# Patient Record
Sex: FEMALE | URBAN - NONMETROPOLITAN AREA
[De-identification: names, ages, dates, MRNs, and addresses within clinical notes are randomized per-mention and may not be internally consistent; named-entity substitution may affect disease eponyms.]

---

## 2022-09-20 ENCOUNTER — NURSE ONLY (OUTPATIENT)
Dept: LAB | Age: 47
End: 2022-09-20

## 2022-10-03 LAB — CYTOLOGY THIN PREP PAP: NORMAL

## 2023-09-25 ENCOUNTER — NURSE ONLY (OUTPATIENT)
Dept: LAB | Age: 48
End: 2023-09-25

## 2023-10-02 LAB — CYTOLOGY THIN PREP PAP: NORMAL

## 2023-11-06 ENCOUNTER — NURSE ONLY (OUTPATIENT)
Dept: LAB | Age: 48
End: 2023-11-06

## 2023-12-06 ENCOUNTER — NURSE ONLY (OUTPATIENT)
Dept: LAB | Age: 48
End: 2023-12-06

## 2024-01-26 ENCOUNTER — HOSPITAL ENCOUNTER (OUTPATIENT)
Age: 49
Discharge: HOME | End: 2024-01-26
Payer: COMMERCIAL

## 2024-01-26 LAB
ANION GAP SERPL CALC-SCNC: 12 MEQ/L (ref 8–16)
BASOPHILS ABSOLUTE: 0 THOU/MM3 (ref 0–0.1)
BASOPHILS NFR BLD AUTO: 0.4 %
BUN SERPL-MCNC: 12 MG/DL (ref 7–22)
CALCIUM SERPL-MCNC: 9.4 MG/DL (ref 8.5–10.5)
CHLORIDE SERPL-SCNC: 103 MEQ/L (ref 98–111)
CO2 SERPL-SCNC: 27 MEQ/L (ref 23–33)
CREAT SERPL-MCNC: 0.8 MG/DL (ref 0.4–1.2)
DEPRECATED RDW RBC AUTO: 41.3 FL (ref 35–45)
EKG ATRIAL RATE: 66 BPM
EKG P AXIS: 43 DEGREES
EKG P-R INTERVAL: 166 MS
EKG Q-T INTERVAL: 416 MS
EKG QRS DURATION: 76 MS
EKG QTC CALCULATION (BAZETT): 436 MS
EKG R AXIS: 49 DEGREES
EKG T AXIS: 52 DEGREES
EKG VENTRICULAR RATE: 66 BPM
EOSINOPHIL NFR BLD AUTO: 2.4 %
EOSINOPHILS ABSOLUTE: 0.1 THOU/MM3 (ref 0–0.4)
ERYTHROCYTE [DISTWIDTH] IN BLOOD BY AUTOMATED COUNT: 12.2 % (ref 11.5–14.5)
GFR SERPL CREATININE-BSD FRML MDRD: > 60 ML/MIN/1.73M2
GLUCOSE SERPL-MCNC: 87 MG/DL (ref 70–108)
HCT VFR BLD AUTO: 44.9 % (ref 37–47)
HGB BLD-MCNC: 15.2 GM/DL (ref 12–16)
IMM GRANULOCYTES # BLD AUTO: 0.01 THOU/MM3 (ref 0–0.07)
IMM GRANULOCYTES NFR BLD AUTO: 0.2 %
LYMPHOCYTES ABSOLUTE: 1.6 THOU/MM3 (ref 1–4.8)
LYMPHOCYTES NFR BLD AUTO: 35.1 %
MCH RBC QN AUTO: 31.3 PG (ref 26–33)
MCHC RBC AUTO-ENTMCNC: 33.9 GM/DL (ref 32.2–35.5)
MCV RBC AUTO: 92.4 FL (ref 81–99)
MONOCYTES ABSOLUTE: 0.3 THOU/MM3 (ref 0.4–1.3)
MONOCYTES NFR BLD AUTO: 7.5 %
NEUTROPHILS NFR BLD AUTO: 54.4 %
NRBC BLD AUTO-RTO: 0 /100 WBC
PLATELET # BLD AUTO: 302 THOU/MM3 (ref 130–400)
PMV BLD AUTO: 8.8 FL (ref 9.4–12.4)
POTASSIUM SERPL-SCNC: 4.4 MEQ/L (ref 3.5–5.2)
RBC # BLD AUTO: 4.86 MILL/MM3 (ref 4.2–5.4)
SEGMENTED NEUTROPHILS ABSOLUTE COUNT: 2.5 THOU/MM3 (ref 1.8–7.7)
SODIUM SERPL-SCNC: 142 MEQ/L (ref 135–145)
WBC # BLD AUTO: 4.6 THOU/MM3 (ref 4.8–10.8)

## 2024-01-26 PROCEDURE — 80048 BASIC METABOLIC PNL TOTAL CA: CPT

## 2024-01-26 PROCEDURE — 36415 COLL VENOUS BLD VENIPUNCTURE: CPT

## 2024-01-26 PROCEDURE — 85025 COMPLETE CBC W/AUTO DIFF WBC: CPT

## 2024-01-26 PROCEDURE — 93005 ELECTROCARDIOGRAM TRACING: CPT

## 2024-01-29 LAB
EKG ATRIAL RATE: 66 BPM
EKG P AXIS: 43 DEGREES
EKG P-R INTERVAL: 166 MS
EKG Q-T INTERVAL: 416 MS
EKG QRS DURATION: 76 MS
EKG QTC CALCULATION (BAZETT): 436 MS
EKG R AXIS: 49 DEGREES
EKG T AXIS: 52 DEGREES
EKG VENTRICULAR RATE: 66 BPM

## 2024-02-07 RX ORDER — ACETAMINOPHEN 325 MG/1
650 TABLET ORAL EVERY 6 HOURS PRN
COMMUNITY

## 2024-02-07 RX ORDER — M-VIT,TX,IRON,MINS/CALC/FOLIC 27MG-0.4MG
1 TABLET ORAL DAILY
COMMUNITY

## 2024-02-07 RX ORDER — LISINOPRIL 10 MG/1
10 TABLET ORAL DAILY
COMMUNITY

## 2024-02-07 RX ORDER — CALCIUM POLYCARBOPHIL 625 MG 625 MG/1
625 TABLET ORAL DAILY
COMMUNITY

## 2024-02-07 RX ORDER — CALCIUM CARBONATE 500(1250)
1000 TABLET ORAL DAILY
COMMUNITY

## 2024-02-07 RX ORDER — VALACYCLOVIR HYDROCHLORIDE 1 G/1
1000 TABLET, FILM COATED ORAL DAILY
COMMUNITY

## 2024-02-07 RX ORDER — CETIRIZINE HYDROCHLORIDE 10 MG/1
10 TABLET ORAL DAILY
COMMUNITY

## 2024-02-07 NOTE — PROGRESS NOTES
Follow all instructions given by your physician  Do not eat or drink anything after midnight prior to surgery(includes water, chewing gum, mints and ice chips)  Sips of water am of surgery with allowed medications  Do not use chewing tobacco after midnight prior to surgery  May brush teeth do not swallow water  Do not smoke, drink alcoholic beverages or use any illicit drugs for 24 hours prior to surgery  Bring insurance info and photo ID  Bring pertinent paperwork with you from Doctor or surgeons's office  Wear clean comfortable, loose-fitting clothing  No make-up, nail polish, jewelry, piercings, or contact lenses to be worn day of surgery  No glue on dentures morning of surgery; you will be asked to remove them for surgery. Case for glasses.  Shower the night before and the morning of surgery with cleansing soap provided or a liquid antibacterial soap, dry with new fresh clean towel after each shower, no lotions, creams or powder.  Clean sheets and pillowcase on bed night before surgery  Bring medications in original bottles, Bring rescue inhalers with you  Bring CPAP/BIPAP machine if you have one ( you may be charged if one is needed in recovery room )    Our pharmacy has a Meds to Beds program where they will deliver any new prescriptions you may have to your room before you leave. Our Pharmacy will clear it through your insurance; for example (same co pay). This enables you to take your new RX as soon as you need when you get home and avoids stop/wait delays on the way home.  Please have a form of payment with you and have someone designated as your Pharmacy contact with their phone # as you may not feel well or still be under the influence of anesthesia.    Please refer to the SSI-Surgical Site Infection Flyer you hopefully received in the mail-together we can prevent infections; signs and symptoms reviewed.  When discharged be sure you understand how to care for your wound and that you have the Dr./office  phone # to call if you have any concerns or questions about your wound.     needed at discharge and someone over 18 to stay with you for 24 hours overnight (surgery may be cancelled if you don't have this)  Report to [facility] on 2nd floor  If you would become ill prior to surgery, please call the surgeon  May have a visitor with you, we request that you limit to 2 visitors in pre-op area  Masks are recommended but not required, new masks at entrance desk  Call [phone] for any questions

## 2024-02-15 ENCOUNTER — ANESTHESIA EVENT (OUTPATIENT)
Dept: OPERATING ROOM | Age: 49
End: 2024-02-15
Payer: COMMERCIAL

## 2024-02-15 ENCOUNTER — ANESTHESIA (OUTPATIENT)
Dept: OPERATING ROOM | Age: 49
End: 2024-02-15
Payer: COMMERCIAL

## 2024-02-15 ENCOUNTER — HOSPITAL ENCOUNTER (OUTPATIENT)
Age: 49
Setting detail: OUTPATIENT SURGERY
Discharge: HOME | End: 2024-02-15
Attending: OBSTETRICS & GYNECOLOGY | Admitting: OBSTETRICS & GYNECOLOGY
Payer: COMMERCIAL

## 2024-02-15 VITALS
WEIGHT: 154.6 LBS | DIASTOLIC BLOOD PRESSURE: 95 MMHG | TEMPERATURE: 97 F | OXYGEN SATURATION: 98 % | SYSTOLIC BLOOD PRESSURE: 181 MMHG | HEART RATE: 65 BPM | RESPIRATION RATE: 16 BRPM | HEIGHT: 62 IN | BODY MASS INDEX: 28.45 KG/M2

## 2024-02-15 DIAGNOSIS — Z90.710 S/P HYSTERECTOMY: Primary | ICD-10-CM

## 2024-02-15 DIAGNOSIS — D06.9 CIN III (CERVICAL INTRAEPITHELIAL NEOPLASIA III): ICD-10-CM

## 2024-02-15 LAB
ABO: NORMAL
ANTIBODY SCREEN: NORMAL
PREGNANCY, URINE: NEGATIVE
RH FACTOR: NORMAL

## 2024-02-15 PROCEDURE — S2900 ROBOTIC SURGICAL SYSTEM: HCPCS | Performed by: OBSTETRICS & GYNECOLOGY

## 2024-02-15 PROCEDURE — 36415 COLL VENOUS BLD VENIPUNCTURE: CPT

## 2024-02-15 PROCEDURE — 3600000019 HC SURGERY ROBOT ADDTL 15MIN: Performed by: OBSTETRICS & GYNECOLOGY

## 2024-02-15 PROCEDURE — 7100000011 HC PHASE II RECOVERY - ADDTL 15 MIN: Performed by: OBSTETRICS & GYNECOLOGY

## 2024-02-15 PROCEDURE — 86850 RBC ANTIBODY SCREEN: CPT

## 2024-02-15 PROCEDURE — 6370000000 HC RX 637 (ALT 250 FOR IP): Performed by: OBSTETRICS & GYNECOLOGY

## 2024-02-15 PROCEDURE — 86900 BLOOD TYPING SEROLOGIC ABO: CPT

## 2024-02-15 PROCEDURE — 6360000002 HC RX W HCPCS: Performed by: OBSTETRICS & GYNECOLOGY

## 2024-02-15 PROCEDURE — 2580000003 HC RX 258: Performed by: OBSTETRICS & GYNECOLOGY

## 2024-02-15 PROCEDURE — 3700000000 HC ANESTHESIA ATTENDED CARE: Performed by: OBSTETRICS & GYNECOLOGY

## 2024-02-15 PROCEDURE — 2720000010 HC SURG SUPPLY STERILE: Performed by: OBSTETRICS & GYNECOLOGY

## 2024-02-15 PROCEDURE — 2500000003 HC RX 250 WO HCPCS: Performed by: NURSE ANESTHETIST, CERTIFIED REGISTERED

## 2024-02-15 PROCEDURE — 6360000002 HC RX W HCPCS: Performed by: NURSE ANESTHETIST, CERTIFIED REGISTERED

## 2024-02-15 PROCEDURE — 3700000001 HC ADD 15 MINUTES (ANESTHESIA): Performed by: OBSTETRICS & GYNECOLOGY

## 2024-02-15 PROCEDURE — 86901 BLOOD TYPING SEROLOGIC RH(D): CPT

## 2024-02-15 PROCEDURE — 2709999900 HC NON-CHARGEABLE SUPPLY: Performed by: OBSTETRICS & GYNECOLOGY

## 2024-02-15 PROCEDURE — 88307 TISSUE EXAM BY PATHOLOGIST: CPT

## 2024-02-15 PROCEDURE — 3600000009 HC SURGERY ROBOT BASE: Performed by: OBSTETRICS & GYNECOLOGY

## 2024-02-15 PROCEDURE — 88342 IMHCHEM/IMCYTCHM 1ST ANTB: CPT

## 2024-02-15 PROCEDURE — 7100000000 HC PACU RECOVERY - FIRST 15 MIN: Performed by: OBSTETRICS & GYNECOLOGY

## 2024-02-15 PROCEDURE — 7100000010 HC PHASE II RECOVERY - FIRST 15 MIN: Performed by: OBSTETRICS & GYNECOLOGY

## 2024-02-15 PROCEDURE — 7100000001 HC PACU RECOVERY - ADDTL 15 MIN: Performed by: OBSTETRICS & GYNECOLOGY

## 2024-02-15 PROCEDURE — 81025 URINE PREGNANCY TEST: CPT

## 2024-02-15 RX ORDER — DEXAMETHASONE SODIUM PHOSPHATE 4 MG/ML
INJECTION, SOLUTION INTRA-ARTICULAR; INTRALESIONAL; INTRAMUSCULAR; INTRAVENOUS; SOFT TISSUE PRN
Status: DISCONTINUED | OUTPATIENT
Start: 2024-02-15 | End: 2024-02-15 | Stop reason: SDUPTHER

## 2024-02-15 RX ORDER — ACETAMINOPHEN 500 MG
1000 TABLET ORAL EVERY 8 HOURS SCHEDULED
Status: CANCELLED | OUTPATIENT
Start: 2024-02-15

## 2024-02-15 RX ORDER — SODIUM CHLORIDE 9 MG/ML
INJECTION, SOLUTION INTRAVENOUS CONTINUOUS
Status: CANCELLED | OUTPATIENT
Start: 2024-02-15

## 2024-02-15 RX ORDER — PROPOFOL 10 MG/ML
INJECTION, EMULSION INTRAVENOUS PRN
Status: DISCONTINUED | OUTPATIENT
Start: 2024-02-15 | End: 2024-02-15 | Stop reason: SDUPTHER

## 2024-02-15 RX ORDER — OXYCODONE HYDROCHLORIDE 5 MG/1
10 TABLET ORAL EVERY 4 HOURS PRN
Status: DISCONTINUED | OUTPATIENT
Start: 2024-02-15 | End: 2024-02-15 | Stop reason: HOSPADM

## 2024-02-15 RX ORDER — ONDANSETRON 4 MG/1
4 TABLET, ORALLY DISINTEGRATING ORAL EVERY 8 HOURS PRN
Status: CANCELLED | OUTPATIENT
Start: 2024-02-15

## 2024-02-15 RX ORDER — LIDOCAINE HYDROCHLORIDE 20 MG/ML
INJECTION, SOLUTION INFILTRATION; PERINEURAL PRN
Status: DISCONTINUED | OUTPATIENT
Start: 2024-02-15 | End: 2024-02-15 | Stop reason: SDUPTHER

## 2024-02-15 RX ORDER — ROCURONIUM BROMIDE 10 MG/ML
INJECTION, SOLUTION INTRAVENOUS PRN
Status: DISCONTINUED | OUTPATIENT
Start: 2024-02-15 | End: 2024-02-15 | Stop reason: SDUPTHER

## 2024-02-15 RX ORDER — FENTANYL CITRATE 50 UG/ML
INJECTION, SOLUTION INTRAMUSCULAR; INTRAVENOUS PRN
Status: DISCONTINUED | OUTPATIENT
Start: 2024-02-15 | End: 2024-02-15 | Stop reason: SDUPTHER

## 2024-02-15 RX ORDER — SODIUM CHLORIDE 0.9 % (FLUSH) 0.9 %
5-40 SYRINGE (ML) INJECTION PRN
Status: DISCONTINUED | OUTPATIENT
Start: 2024-02-15 | End: 2024-02-15 | Stop reason: HOSPADM

## 2024-02-15 RX ORDER — OXYCODONE HYDROCHLORIDE 5 MG/1
5 TABLET ORAL EVERY 4 HOURS PRN
Status: DISCONTINUED | OUTPATIENT
Start: 2024-02-15 | End: 2024-02-15 | Stop reason: HOSPADM

## 2024-02-15 RX ORDER — SODIUM CHLORIDE 9 MG/ML
INJECTION, SOLUTION INTRAVENOUS CONTINUOUS
Status: DISCONTINUED | OUTPATIENT
Start: 2024-02-15 | End: 2024-02-15 | Stop reason: HOSPADM

## 2024-02-15 RX ORDER — SODIUM CHLORIDE 9 MG/ML
INJECTION, SOLUTION INTRAVENOUS PRN
Status: DISCONTINUED | OUTPATIENT
Start: 2024-02-15 | End: 2024-02-15 | Stop reason: HOSPADM

## 2024-02-15 RX ORDER — KETOROLAC TROMETHAMINE 10 MG/1
10 TABLET, FILM COATED ORAL EVERY 6 HOURS PRN
Qty: 20 TABLET | Refills: 0 | Status: SHIPPED | OUTPATIENT
Start: 2024-02-15 | End: 2025-02-14

## 2024-02-15 RX ORDER — ONDANSETRON 2 MG/ML
4 INJECTION INTRAMUSCULAR; INTRAVENOUS EVERY 6 HOURS PRN
Status: CANCELLED | OUTPATIENT
Start: 2024-02-15

## 2024-02-15 RX ORDER — SODIUM CHLORIDE 9 MG/ML
INJECTION, SOLUTION INTRAVENOUS PRN
Status: CANCELLED | OUTPATIENT
Start: 2024-02-15

## 2024-02-15 RX ORDER — SODIUM CHLORIDE 0.9 % (FLUSH) 0.9 %
5-40 SYRINGE (ML) INJECTION EVERY 12 HOURS SCHEDULED
Status: CANCELLED | OUTPATIENT
Start: 2024-02-15

## 2024-02-15 RX ORDER — OXYCODONE HYDROCHLORIDE 5 MG/1
5 TABLET ORAL EVERY 6 HOURS PRN
Qty: 28 TABLET | Refills: 0 | Status: SHIPPED | OUTPATIENT
Start: 2024-02-15 | End: 2024-02-22

## 2024-02-15 RX ORDER — SODIUM CHLORIDE 0.9 % (FLUSH) 0.9 %
5-40 SYRINGE (ML) INJECTION EVERY 12 HOURS SCHEDULED
Status: DISCONTINUED | OUTPATIENT
Start: 2024-02-15 | End: 2024-02-15 | Stop reason: HOSPADM

## 2024-02-15 RX ORDER — DOCUSATE SODIUM 100 MG/1
100 CAPSULE, LIQUID FILLED ORAL 2 TIMES DAILY
Status: CANCELLED | OUTPATIENT
Start: 2024-02-15

## 2024-02-15 RX ORDER — ONDANSETRON 2 MG/ML
INJECTION INTRAMUSCULAR; INTRAVENOUS PRN
Status: DISCONTINUED | OUTPATIENT
Start: 2024-02-15 | End: 2024-02-15 | Stop reason: SDUPTHER

## 2024-02-15 RX ORDER — HYDROMORPHONE HYDROCHLORIDE 2 MG/ML
INJECTION, SOLUTION INTRAMUSCULAR; INTRAVENOUS; SUBCUTANEOUS PRN
Status: DISCONTINUED | OUTPATIENT
Start: 2024-02-15 | End: 2024-02-15 | Stop reason: SDUPTHER

## 2024-02-15 RX ORDER — SODIUM CHLORIDE 0.9 % (FLUSH) 0.9 %
5-40 SYRINGE (ML) INJECTION PRN
Status: CANCELLED | OUTPATIENT
Start: 2024-02-15

## 2024-02-15 RX ADMIN — ROCURONIUM BROMIDE 50 MG: 10 INJECTION INTRAVENOUS at 10:04

## 2024-02-15 RX ADMIN — SODIUM CHLORIDE: 9 INJECTION, SOLUTION INTRAVENOUS at 11:15

## 2024-02-15 RX ADMIN — ROCURONIUM BROMIDE 10 MG: 10 INJECTION INTRAVENOUS at 10:51

## 2024-02-15 RX ADMIN — LIDOCAINE HYDROCHLORIDE 100 MG: 20 INJECTION, SOLUTION INFILTRATION; PERINEURAL at 10:04

## 2024-02-15 RX ADMIN — HYDROMORPHONE HYDROCHLORIDE 0.5 MG: 2 INJECTION INTRAMUSCULAR; INTRAVENOUS; SUBCUTANEOUS at 11:12

## 2024-02-15 RX ADMIN — DEXAMETHASONE SODIUM PHOSPHATE 4 MG: 4 INJECTION, SOLUTION INTRAMUSCULAR; INTRAVENOUS at 10:07

## 2024-02-15 RX ADMIN — FENTANYL CITRATE 100 MCG: 50 INJECTION, SOLUTION INTRAMUSCULAR; INTRAVENOUS at 10:04

## 2024-02-15 RX ADMIN — OXYCODONE 10 MG: 5 TABLET ORAL at 12:36

## 2024-02-15 RX ADMIN — ONDANSETRON 4 MG: 2 INJECTION INTRAMUSCULAR; INTRAVENOUS at 11:11

## 2024-02-15 RX ADMIN — PROPOFOL 200 MG: 10 INJECTION, EMULSION INTRAVENOUS at 10:04

## 2024-02-15 RX ADMIN — HYDROMORPHONE HYDROCHLORIDE 0.5 MG: 2 INJECTION INTRAMUSCULAR; INTRAVENOUS; SUBCUTANEOUS at 11:18

## 2024-02-15 RX ADMIN — HYDROMORPHONE HYDROCHLORIDE 0.5 MG: 2 INJECTION INTRAMUSCULAR; INTRAVENOUS; SUBCUTANEOUS at 11:29

## 2024-02-15 RX ADMIN — SODIUM CHLORIDE: 9 INJECTION, SOLUTION INTRAVENOUS at 09:58

## 2024-02-15 RX ADMIN — HYDROMORPHONE HYDROCHLORIDE 0.5 MG: 2 INJECTION INTRAMUSCULAR; INTRAVENOUS; SUBCUTANEOUS at 10:25

## 2024-02-15 RX ADMIN — SUGAMMADEX 200 MG: 100 INJECTION, SOLUTION INTRAVENOUS at 11:20

## 2024-02-15 RX ADMIN — Medication 2000 MG: at 09:59

## 2024-02-15 RX ADMIN — PHENYLEPHRINE HYDROCHLORIDE 100 MCG: 10 INJECTION INTRAVENOUS at 10:55

## 2024-02-15 ASSESSMENT — PAIN DESCRIPTION - DESCRIPTORS
DESCRIPTORS: ACHING
DESCRIPTORS: SORE

## 2024-02-15 ASSESSMENT — PAIN SCALES - GENERAL
PAINLEVEL_OUTOF10: 4
PAINLEVEL_OUTOF10: 4

## 2024-02-15 ASSESSMENT — PAIN DESCRIPTION - LOCATION: LOCATION: ABDOMEN

## 2024-02-15 ASSESSMENT — PAIN - FUNCTIONAL ASSESSMENT
PAIN_FUNCTIONAL_ASSESSMENT: 0-10
PAIN_FUNCTIONAL_ASSESSMENT: 0-10

## 2024-02-15 ASSESSMENT — PAIN DESCRIPTION - PAIN TYPE: TYPE: SURGICAL PAIN

## 2024-02-15 NOTE — PROGRESS NOTES
1129 Awake and oriented on arrival to PACU , pt c/o # 3 ABD pain , medicated with Dilaudid per CRNA  1145 resting resp easy O2 off  1200 resting resp easy  , awakens easily to name , states pain tolerable just feels sleepy    Meets criteria for discharge , transported to [facility]

## 2024-02-15 NOTE — DISCHARGE INSTRUCTIONS
DISCHARGE INSTRUCTIONS FOR  PATIENTS AND FAMILY  [facility]  [phone]  [address] [place] [address] [address]  [place], [place] [address]      Discharge Instructions for GYN SURGERY      Home Care    You may shower 2 days after surgery.  You may bathe/soak in water/swim in 2 weeks.    Keep your incision sites clean.    Wash your hands before changing the dressing.    Do not douche or put anything in your vagina, such as a tampon, until your doctor tells you otherwise. NO INTERCOURSE UNTIL YOU ARE TOLD OTHERWISE.   Diet    While in the hospital, you will progress from intravenous fluid to a normal diet.   Eat a high-fiber diet to promote healing and prevent constipation .    Drink plenty of fluids.    Physical Activity    Ask your doctor when you will be able to return to work.  You may drive 2 weeks after surgery if you are off narcotics for pain.    Return to your normal activities gradually. Most normal activities, including sex, can be resumed in about six weeks.    Take daily walks as tolerated.    Avoid heavy lifting and strenuous exercise until your doctor gives you permission to do so, usually 4-6 weeks.    If you are discharged with a catheter, you will be instructed on home care and when/how to remove it.  BE SURE ALL OF YOUR QUESTIONS ARE ANSWERED BEFORE DISCHARGE.      Medications    If you had to stop medicines before the procedure, ask your doctor when you can start again. Medicines often stopped include:   Anti-inflammatory drugs (eg, aspirin )   Blood thinners, like clopidogrel (Plavix) or warfarin (Coumadin)   You will likely go home with a prescription for pain medicine. If you had your ovaries removed with your uterus, you may be given an estrogen supplement.   Also, to prevent constipation, your doctor may recommend a stool softener.   If you are taking medicines, follow these general guidelines:   Take your medicine as directed. Do not change the amount or the schedule.   Do not stop taking them

## 2024-02-15 NOTE — ANESTHESIA POSTPROCEDURE EVALUATION
Department of Anesthesiology  Postprocedure Note    Patient: Katerin Perry  MRN: [id number]  YOB: 1975  Date of evaluation: 2/15/2024    Procedure Summary       Date: 02/15/24 Room / Location: [facility] OR [address] [place] [address] OR    Anesthesia Start: 0959 Anesthesia Stop: 1133    Procedure: Robotic Hysterectomy Bilateral Salpingectomy Cystoscopy (Uterus) Diagnosis:       BARRY III (cervical intraepithelial neoplasia III)      (BARRY III (cervical intraepithelial neoplasia III) [D06.9])    Surgeons: Joy Rey MD Responsible Provider: Emil Parker DO    Anesthesia Type: general ASA Status: 2            Anesthesia Type: No value filed.    Ajckie Phase I: Jackie Score: 10    Jackie Phase II: Jackie Score: 10    Anesthesia Post Evaluation    Patient location during evaluation: PACU  Patient participation: complete - patient participated  Level of consciousness: awake and alert  Pain score: 4  Airway patency: patent  Nausea & Vomiting: no nausea and no vomiting  Cardiovascular status: hemodynamically stable and blood pressure returned to baseline  Respiratory status: spontaneous ventilation, room air and acceptable  Hydration status: stable  Pain management: adequate and satisfactory to patient    No notable events documented.

## 2024-02-15 NOTE — ANESTHESIA PRE PROCEDURE
GLUCOSE 88 04/29/2023 06:30 AM    PROT 7.2 04/29/2023 06:30 AM    CALCIUM 9.4 01/26/2024 10:25 AM    BILITOT 0.5 04/29/2023 06:30 AM    ALKPHOS 84 04/29/2023 06:30 AM    AST 17 04/29/2023 06:30 AM    ALT 16 04/29/2023 06:30 AM       POC Tests: No results for input(s): \"POCGLU\", \"POCNA\", \"POCK\", \"POCCL\", \"POCBUN\", \"POCHEMO\", \"POCHCT\" in the last 72 hours.    Coags: No results found for: \"PROTIME\", \"INR\", \"APTT\"    HCG (If Applicable):   Lab Results   Component Value Date    PREGTESTUR negative 02/15/2024        ABGs: No results found for: \"PHART\", \"PO2ART\", \"RHU7BRK\", \"PUP1LBW\", \"BEART\", \"N3DVTSJQ\"     Type & Screen (If Applicable):  No results found for: \"LABABO\", \"LABRH\"    Drug/Infectious Status (If Applicable):  No results found for: \"HIV\", \"HEPCAB\"    COVID-19 Screening (If Applicable): No results found for: \"COVID19\"        Anesthesia Evaluation  Patient summary reviewed   no history of anesthetic complications:   Airway: Mallampati: II  TM distance: >3 FB   Neck ROM: full  Mouth opening: > = 3 FB   Dental: normal exam         Pulmonary:normal exam  breath sounds clear to auscultation                             Cardiovascular:    (+) hypertension:                  Neuro/Psych:               GI/Hepatic/Renal:             Endo/Other:                     Abdominal:             Vascular:          Other Findings:             Anesthesia Plan      general     ASA 2       Induction: intravenous.    MIPS: Postoperative opioids intended and Prophylactic antiemetics administered.  Anesthetic plan and risks discussed with patient and spouse.      Plan discussed with CRNA and surgical team.                    NICK FLOYD MD   2/15/2024

## 2024-02-15 NOTE — PROGRESS NOTES
Pt has met discharge criteria and states she is ready for discharge to home. IV removed, gauze and tape applied. Dressed in own clothes and personal belongings gathered. Discharge instructions (with opioid medication education information) given to pt and ; pt and  verbalized understanding of discharge instructions, prescriptions and follow up appointments. Pt transported to discharge lobby by [facility] staff.

## 2024-02-15 NOTE — PROGRESS NOTES
Pt returned to [facility] room 16. Vitals and assessment as charted. 0.9 infusing,  to count from PACU. Pt has crackers and water.  at the bedside. Pt and  verbalized understanding of discharge criteria and call light use. Call light in reach.

## 2024-02-15 NOTE — BRIEF OP NOTE
Brief Postoperative Note      Patient: Katerin Perry  YOB: 1975  MRN: [id number]    Date of Procedure: 2/15/2024    Pre-Op Diagnosis Codes:     * BARRY III (cervical intraepithelial neoplasia III) [D06.9]    Post-Op Diagnosis: Same       Procedure(s):  Robotic Hysterectomy Bilateral Salpingectomy Cystoscopy    Surgeon(s):  Joy Rey MD    Assistant:  First Assistant: Linette Osorio RN    Anesthesia: General    Estimated Blood Loss (mL): 50cc    Complications: None    Specimens:   ID Type Source Tests Collected by Time Destination   A : uterus, cervix and bilateral fallopian tubes Tissue Uterus SURGICAL PATHOLOGY Joy Rey MD 2/15/2024 1032        Implants:  * No implants in log *      Drains: * No LDAs found *    Findings: Normal appearing uterus tubes and ovaries    Op note: [id number]      Electronically signed by Joy Rey MD on 2/15/2024 at 11:20 AM

## 2024-02-15 NOTE — PROGRESS NOTES
Patient oriented to Same Day department and admitted to Same Day Surgery room 16.   Patient verbalized approval for first name, last initial with physician name on unit whiteboard.     Plan of care reviewed with patient.   Patient room whiteboard filled out and discussed with patient and responsible adult.   Patient and responsible adult offered Same Day Welcome Packet to review.    Call light in reach.   Bed in lowest position, locked, with one bed rail up.   SCDs and warming blanket in place.  Appropriate arm bands on patient.   Bathroom offered.   All questions and concerns of patient addressed.        Meds to Beds:   Patient informed of [facility] Meds to Beds program during admission. Patient is agreeable to program.   Contact information for the pharmacy and the Meds to Beds program:   Name: sophie   Relationship to patient:spouse/significant other   Phone number: [phone]

## 2024-02-15 NOTE — DISCHARGE SUMMARY
GYN Surgery  Discharge Summary     Patient ID:  Katerin Perry  [id number]  48 y.o.  1975    Admit date: 2/15/2024    Admitting Physician: Joy Rey MD    Discharge Diagnoses: BARRY III (cervical intraepithelial neoplasia III) [D06.9]    Discharged Condition: good      Procedures Performed: Robotic hysterectomy BS cystoscopy    Hospital Course: Patient was admitted on the day of surgery, and underwent an uncomplicated procedure.  See dictated op note.     Disposition: home    Patient Instructions:   Activity: activity as tolerated, no sex for 6 weeks, no driving while on analgesics, and no heavy lifting for 6 weeks  Diet: regular  Wound Care: as directed    Discharge Medication:      Medication List        START taking these medications      ketorolac 10 MG tablet  Commonly known as: TORADOL  Take 1 tablet by mouth every 6 hours as needed for Pain     oxyCODONE 5 MG immediate release tablet  Commonly known as: Roxicodone  Take 1 tablet by mouth every 6 hours as needed for Pain for up to 7 days. Intended supply: 7 days. Take lowest dose possible to manage pain Max Daily Amount: 20 mg            CONTINUE taking these medications      acetaminophen 325 MG tablet  Commonly known as: TYLENOL     calcium carbonate 500 MG Tabs tablet  Commonly known as: OSCAL     cetirizine 10 MG tablet  Commonly known as: ZYRTEC     lisinopril 10 MG tablet  Commonly known as: PRINIVIL;ZESTRIL     polycarbophil 625 MG tablet  Commonly known as: FIBERCON     therapeutic multivitamin-minerals tablet     valACYclovir 1 g tablet  Commonly known as: VALTREX               Where to Get Your Medications        These medications were sent to [facility]/pharmacy #[id number] - [place], [place] - [address] [facility] - P [phone] - F [phone]  [address] [place] [id number]      Phone: [phone]   ketorolac 10 MG tablet  oxyCODONE 5 MG immediate release tablet          Discharge Date: 2/15/24    Condition: Good    Follow-up  with Joy Rey MD in 1 week    Signed:  Electronically signed by Joy Rey MD on 2/15/2024 at 11:19 AM

## 2024-02-15 NOTE — OP NOTE
[place] [id number]                                OPERATIVE REPORT    PATIENT NAME: KOUTRNEY ROBERTS                 :        1975  MED REC NO:   [id number]                           ROOM:  ACCOUNT NO:   [id number]                           ADMIT DATE: 02/15/2024  PROVIDER:     Joy Rey M.D.    DATE OF PROCEDURE:  02/15/2024    PREOPERATIVE DIAGNOSIS:  BARRY-3.    POSTOPERATIVE DIAGNOSIS:  BARRY-3.    PROCEDURES:  Robotic hysterectomy, bilateral salpingectomy, cystoscopy.    ANESTHESIA:  General.    SURGEON:  Joy Rey MD    EBL:  50 mL.    COMPLICATIONS:  None.    SPECIMENS:  Uterus, cervix, and bilateral fallopian tubes.    DESCRIPTION OF THE PROCEDURE:  The patient was taken back to the  operating room.  General anesthesia was obtained without difficulty.   She was then placed in the dorsal lithotomy position, prepared and  draped in normal sterile fashion.  A weighted speculum placed in the  vagina.  A [place] was used to retract the anterior vaginal wall.   Single-tooth tenaculum was used to grasp the anterior lip of the cervix.  Her cervix was then serially dilated.  0 Vicryl stay sutures were placed  in both the anterior and posterior lip of the cervix.  A large VCare  device was placed with the green cup tied to the cervix.  Blue cup was  moved adjacent to the green cup.  Adequate mobility of the uterus was  noted.  A [place] catheter was then placed as well.  Her legs were then  lowered.  A change of gloves occurred.  Attention was turned to the  abdominal portion of the procedure.  A supraumbilical incision was made.  Entry was gained into the abdominal cavity blindly.  Intra-abdominal  placement was confirmed visually.  Insufflation occurred under direct  visualization as well.  8-mm ports were then placed bilaterally about 10  cm, bilateral to the camera port, and then in the left upper quadrant,  an

## 2024-02-15 NOTE — PROGRESS NOTES
Notified  of pts increased blood pressure. Pt is automatic, okay to discharge pt an have her take her BP medication at home. Pt states she did not take it and verbalized taking her lisinopril when she gets home.

## 2024-02-15 NOTE — H&P
[facility]  History and Physicial      Patient:  Katerin Perry  YOB: 1975  MRN: [id number]     Acct: [id number]    HISTORY OF PRESENT ILLNESS:     Katerin Perry is a 48 y.o. female with endocervical CIN3.    Obstetric History: No obstetric history on file.    Past Medical History:        Diagnosis Date    Hypertension        Past Surgical History:        Procedure Laterality Date    ECTOPIC PREGNANCY SURGERY  2013    WISDOM TOOTH EXTRACTION         Medications: Scheduled Meds:   sodium chloride flush  5-40 mL IntraVENous 2 times per day    ceFAZolin (ANCEF) IVPB  2,000 mg IntraVENous On Call to OR     Continuous Infusions:   sodium chloride      sodium chloride       PRN Meds:.sodium chloride flush, sodium chloride    Allergies:  Neosporin [bacitracin-polymyxin b]    Social History:    reports that she has never smoked. She has never used smokeless tobacco. She reports that she does not use drugs.    Family History:       Problem Relation Age of Onset    High Blood Pressure Mother     Cancer Brother         melanoma    High Blood Pressure Brother     Breast Cancer Maternal Grandmother     Colon Cancer Maternal Grandfather     Diabetes Paternal Grandmother     Cancer Paternal Grandmother         lung cancer       Review of Systems:  General ROS: negative  Respiratory ROS: negative  Cardiovascular ROS: negative  Gastrointestinal ROS: negative  Musculoskeletal ROS: negative  Neurological ROS: negative    Physical Exam:    Vitals:Patient Vitals for the past 24 hrs:   BP Temp Temp src Pulse Resp SpO2 Height Weight   02/15/24 0842 (!) 164/96 97 °F (36.1 °C) Temporal 78 16 100 % 1.575 m (5' 2\") 70.1 kg (154 lb 9.6 oz)          Wt Readings from Last 1 Encounters:   02/15/24 70.1 kg (154 lb 9.6 oz)         General appearance - alert, well appearing, and in no distress  Abdomen - deferred  Pelvic - deferred  Neurological - alert, oriented, normal speech, no focal findings or movement

## 2024-02-29 ENCOUNTER — HOSPITAL ENCOUNTER (OUTPATIENT)
Dept: PHYSICAL THERAPY | Age: 49
Setting detail: THERAPIES SERIES
Discharge: HOME | End: 2024-02-29
Payer: COMMERCIAL

## 2024-02-29 PROCEDURE — 97530 THERAPEUTIC ACTIVITIES: CPT

## 2024-02-29 PROCEDURE — 97140 MANUAL THERAPY 1/> REGIONS: CPT

## 2024-02-29 PROCEDURE — 97110 THERAPEUTIC EXERCISES: CPT

## 2024-02-29 PROCEDURE — 97163 PT EVAL HIGH COMPLEX 45 MIN: CPT

## 2024-02-29 NOTE — PROGRESS NOTES
** PLEASE SIGN, DATE AND TIME CERTIFICATION BELOW AND RETURN TO [facility] OUTPATIENT REHABILITATION (FAX #: [phone]).  ATTEST/CO-SIGN IF ACCESSING VIA [employer].  THANK YOU.**    I certify that I have examined the patient below and determined that Physical Medicine and Rehabilitation service is necessary and that I approve the established plan of care for up to 90 days or as specifically noted.  Attestation, signature or co-signature of physician indicates approval of certification requirements.    ________________________ ____________ __________  Physician Signature   Date   Time  [facility]  PHYSICAL THERAPY  OUTPATIENT REHABILITATION - SPECIALIZED THERAPY SERVICES  [x] PELVIC HEALTH EVALUATION  [] DAILY NOTE  [] PROGRESS NOTE [] DISCHARGE NOTE    Date: 2024  Patient Name:  Katerin Perry  : 1975  MRN: [id number]  CSN: [id number]    Referring Practitioner Joy Rey MD   Diagnosis Low back pain, unspecified   Treatment Diagnosis M62.89 - Other Specified Disorders of Muscle  M62.81 - Muscle Weakness (Generalized)  M54.5 - Low Back Pain  M62.830 - Muscle Spasms of Back, K55.09 - Other Constipation, and R10.30 - Lower Abdominal Pain, Unspecified  L90.5 - Scar Conditions and Fibrosis of Skin  M79.10 - Myalgia, Unspecified Site   Date of Evaluation 24    Additional Pertinent History HTN, ectopic pregnancy , hysterectomy       Functional Outcome Measure Used Modified HUDSON   Functional Outcome Score 30/50 (24)       Insurance: Primary: Payor: [id number] /  /  / ,   Secondary: UMR   Authorization Information: No precert required   Visit # 1, 1/10 for progress note (Reporting Period: 24 to     )   Visits Allowed: Allowed 25 visits per calendar year.  0 visits have been used. FOR CPT CODE 07234 AQUATIC ONLY 10 VISITS ALLOWED. Soft Max..    Recertification Date: 24   Physician Follow-Up: Mid March   Physician Orders:    History of Present Illness:

## 2024-03-04 ENCOUNTER — HOSPITAL ENCOUNTER (OUTPATIENT)
Dept: PHYSICAL THERAPY | Age: 49
Setting detail: THERAPIES SERIES
Discharge: HOME | End: 2024-03-04
Payer: COMMERCIAL

## 2024-03-04 PROCEDURE — 97140 MANUAL THERAPY 1/> REGIONS: CPT

## 2024-03-04 PROCEDURE — 97530 THERAPEUTIC ACTIVITIES: CPT

## 2024-03-04 PROCEDURE — 97110 THERAPEUTIC EXERCISES: CPT

## 2024-03-04 NOTE — PROGRESS NOTES
[facility]  PHYSICAL THERAPY  OUTPATIENT REHABILITATION - SPECIALIZED THERAPY SERVICES  [] PELVIC HEALTH EVALUATION  [x] DAILY NOTE  [] PROGRESS NOTE [] DISCHARGE NOTE    Date: 3/4/2024  Patient Name:  Katerin Perry  : 1975  MRN: [id number]  CSN: [id number]    Referring Practitioner Joy Rey MD   Diagnosis Low back pain, unspecified   Treatment Diagnosis M62.89 - Other Specified Disorders of Muscle  M62.81 - Muscle Weakness (Generalized)  M54.5 - Low Back Pain  M62.830 - Muscle Spasms of Back, K55.09 - Other Constipation, and R10.30 - Lower Abdominal Pain, Unspecified  L90.5 - Scar Conditions and Fibrosis of Skin  M79.10 - Myalgia, Unspecified Site   Date of Evaluation 24    Additional Pertinent History HTN, ectopic pregnancy , hysterectomy       Functional Outcome Measure Used Modified HUDSNO   Functional Outcome Score 30/50 (24)       Insurance: Primary: Payor: MEDSurphaceN /  /  / ,   Secondary: UMR   Authorization Information: No precert required   Visit # 2, 2/10 for progress note (Reporting Period: 24 to     )   Visits Allowed: Allowed 25 visits per calendar year.  0 visits have been used. FOR CPT CODE 55042 AQUATIC ONLY 10 VISITS ALLOWED. Soft Max.   Recertification Date: 24   Physician Follow-Up: Mid March   Physician Orders:    History of Present Illness: Pt is a 49 year old female s/p hysterectomy on 2/15/24. Pt reports that her muscles in her lower back feel very tight. She states that she can not get them to relax and it is very uncomfortable. States that she is sitting and laying a lot and feels like she is very stiff. Bathing and getting dressed both are uncomfortable for her with bending forward. Hysterectomy was done due to abnormal cells in the cervix and it was decided to remove the uterus to avoid any complications. Ovaries remain intact at this time. She is on a 10# weight lifting restriction at this time. Had f/u on  and was

## 2024-03-06 ENCOUNTER — HOSPITAL ENCOUNTER (OUTPATIENT)
Dept: PHYSICAL THERAPY | Age: 49
Setting detail: THERAPIES SERIES
Discharge: HOME | End: 2024-03-06
Payer: COMMERCIAL

## 2024-03-06 PROCEDURE — 97140 MANUAL THERAPY 1/> REGIONS: CPT

## 2024-03-06 PROCEDURE — 97530 THERAPEUTIC ACTIVITIES: CPT

## 2024-03-06 PROCEDURE — 97110 THERAPEUTIC EXERCISES: CPT

## 2024-03-06 NOTE — PROGRESS NOTES
Palpation Tender to palp and tightness noted at B iliopsoas with L>R; tender throughout B alice from L1 through sacrum; Tender at B piri with L>R; tender at B glutes with L>R; Tender at B QL with L>R   Observation    Posture WNL   Range of Motion BLE WNL and lumbar flexion limited 25% ; tightness in B hamstrings with L>R   Strength BLE WNL and core 2/5   Gait WNL   Sensation WNL   Edema WNL   Balance/Fall History Denies balance or fall problems   Special Tests            TREATMENT   Precautions:    Pain:     \"X” in shaded column indicates activity completed today    “*\" next to exercise/intervention indicates progression   Modalities Parameters/  Location  Notes                     Manual Therapy Time/Technique  Notes   Lumbar STM 30 min x Prone, thoracic spine with icy-hot   Cupping lumbar alice   With lalo pose, lat lalo pose   Kinesiology tape            Exercise/Intervention   Notes   Nature of condition; PT POC 10 min  x    Dietary fiber, toilet positioning, hydration, stool softener, activity levels 15 min  x    Diaphragmatic breathing for pain control 5 min  x    Seated SKTC 3x30 sec  x    Prone lying 3 min  x To follow seated SKTC   Hamstring stretch 3x30 sec  x    Piri stretch push and pull 3x30 sec  x    Supine adductor stretch 3x30 sec  x    Swiss ball roll outs forward/lateral or table slides 3x30 sec  x                    Specific Interventions Next Treatment: lumbar STM, abdominal STM once bruising is improved, abdominal massage for constipation once incisions are healed; gross hip girdle stretching, core strengthening once pain is managed     Activity/Treatment Tolerance:  [x]  Patient tolerated treatment well  []  Patient limited by fatigue  []  Patient limited by pain   []  Patient limited by medical complications  []  Other:     Patient Education:   [x]  HEP/Education Completed: Patient to continue doing stretching HEP as noted above along with diaphragmatic breathing. Pt also encouraged to

## 2024-03-12 ENCOUNTER — HOSPITAL ENCOUNTER (OUTPATIENT)
Dept: PHYSICAL THERAPY | Age: 49
Setting detail: THERAPIES SERIES
Discharge: HOME | End: 2024-03-12
Payer: COMMERCIAL

## 2024-03-12 PROCEDURE — 97140 MANUAL THERAPY 1/> REGIONS: CPT

## 2024-03-12 PROCEDURE — 97110 THERAPEUTIC EXERCISES: CPT

## 2024-03-12 NOTE — PROGRESS NOTES
over the couch and bending the knees, leaning forward and putting arms up   Maximum Intensity 5/10   Best Intensity 1/10   Todays Rating 4/10   Other/Function      SEXUAL /MENSTRUAL HISTORY [] Deferred secondary to:    Age of First Menstrual Period 14   Are Cycles Regular yes    Pain During Menses no   Number of Pregnancies 1 - a few miscarries    Number of Vaginal Deliveries 1   Number of Caesarean Deliveries 0     BLADDER ASSESSMENT [] Deferred secondary to:   Daily Fluid Ingestion: 2 bottles water recently (used to be 8-10 before surgery), occasional tea, occasional tea, occasional sprite     Urination Frequency Times/Day: every few hours  Times/Night: 0   Volume Large and Medium   Urge Sensation Normal    SYMPTOM QUESTIONNAIRE   Loses Urine Upon: NA   Incontinence Volume: NA   Frequency of Leakage: NA   Wets the Bed: no   Burning/Pain with Urination: no   Difficulty Starting a Stream of Urine: no   Incomplete Emptying No   Strain to Empty Bladder: no   “Falling Out” Feeling: no   Urinate more than 7 times/day: no   Use a form of Leakage Protection: no   Restrict Fluid Intake: no   Stream Strength Average       BOWEL ASSESSMENT [] Deferred secondary to:   Frequency: Every few days; used to be daily   Most Common Stool Consistency: [place] 2   SYMPTOM QUESTIONNAIRE   Strain to have a BM: no   Include fiber in your diet: yes: fiber supplements    Take laxatives/enemas regularly: yes: Colace PRN; Doculax    Pain with BM: yes   Strong urge to have BM: no   Leak/Stain Feces: no   Diarrhea often: no         SEXUAL ASSESSMENT [] Deferred secondary to:   Sexually Active No - post op   Pain with [place] (Dyspareunia) No pain reported         VITALS [] Deferred secondary to:   Height 52\"   Weight 153#       GENERAL ASSESSMENT   [] Deferred secondary to:   Palpation Tender to palp and tightness noted at B iliopsoas with L>R; tender throughout B alice from L1 through sacrum; Tender at B piri with L>R; tender at B glutes

## 2024-03-14 ENCOUNTER — HOSPITAL ENCOUNTER (OUTPATIENT)
Dept: PHYSICAL THERAPY | Age: 49
Setting detail: THERAPIES SERIES
Discharge: HOME | End: 2024-03-14
Payer: COMMERCIAL

## 2024-03-14 PROCEDURE — 97110 THERAPEUTIC EXERCISES: CPT

## 2024-03-14 PROCEDURE — 97140 MANUAL THERAPY 1/> REGIONS: CPT

## 2024-03-14 NOTE — PROGRESS NOTES
[facility]  PHYSICAL THERAPY  OUTPATIENT REHABILITATION - SPECIALIZED THERAPY SERVICES  [] PELVIC HEALTH EVALUATION  [x] DAILY NOTE  [] PROGRESS NOTE [] DISCHARGE NOTE    Date: 3/14/2024  Patient Name:  Katerin Perry  : 1975  MRN: [id number]  CSN: [id number]    Referring Practitioner Joy Rey MD   Diagnosis Low back pain, unspecified   Treatment Diagnosis M62.89 - Other Specified Disorders of Muscle  M62.81 - Muscle Weakness (Generalized)  M54.5 - Low Back Pain  M62.830 - Muscle Spasms of Back, K55.09 - Other Constipation, and R10.30 - Lower Abdominal Pain, Unspecified  L90.5 - Scar Conditions and Fibrosis of Skin  M79.10 - Myalgia, Unspecified Site   Date of Evaluation 24    Additional Pertinent History HTN, ectopic pregnancy , hysterectomy       Functional Outcome Measure Used Modified HUDSON   Functional Outcome Score 30/50 (24)       Insurance: Primary: Payor: [email or web address] /  /  / ,   Secondary: UMR   Authorization Information: No precert required   Visit # 5, 510 for progress note (Reporting Period: 24 to     )   Visits Allowed: Allowed 25 visits per calendar year.  0 visits have been used. FOR CPT CODE 69161 AQUATIC ONLY 10 VISITS ALLOWED. Soft Max.   Recertification Date: 24   Physician Follow-Up: Mid March   Physician Orders:    History of Present Illness: Pt is a 49 year old female s/p hysterectomy on 2/15/24. Pt reports that her muscles in her lower back feel very tight. She states that she can not get them to relax and it is very uncomfortable. States that she is sitting and laying a lot and feels like she is very stiff. Bathing and getting dressed both are uncomfortable for her with bending forward. Hysterectomy was done due to abnormal cells in the cervix and it was decided to remove the uterus to avoid any complications. Ovaries remain intact at this time. She is on a 10# weight lifting restriction at this time. Had f/u on  and was

## 2024-03-18 ENCOUNTER — HOSPITAL ENCOUNTER (OUTPATIENT)
Dept: PHYSICAL THERAPY | Age: 49
Setting detail: THERAPIES SERIES
Discharge: HOME | End: 2024-03-18
Payer: COMMERCIAL

## 2024-03-18 PROCEDURE — 97110 THERAPEUTIC EXERCISES: CPT

## 2024-03-18 PROCEDURE — 97140 MANUAL THERAPY 1/> REGIONS: CPT

## 2024-03-18 NOTE — PROGRESS NOTES
told that everything is looking good. Katorolax and oxycodone for pain PRN. Colace PRN.      SUBJECTIVE: Pt reports continued relief of ant R hip/abdominal pain. States that she was able to walk around the grocery store yesterday; however, did have muscle spasms in the upper and lower back yesterday that did seem to be coming more at random instead of only with transitional movements which were occurring before. She does continue to have spotting but states that it has not worsened any.     Social/Functional History and Current Status:  Medications and Allergies have been reviewed and are listed on Medical History Questionnaire.    Katerin Perry lives with spouse in a single story home with stairs and no handrail to enter.    Task Previous Current   ADL’s  Independent Modified Ind, pts  is doing majority of the house    IADL's Independent Increased pain with getting dressed and showering; needs assistance with tying shoes    Ambulation Independent Independent   Transfers Independent Independent   Recreation Independent walking, yoga in the past Assistance Required   Community Integration Independent Independent   Driving Active  Active  but not at this time due to post-op restrictions   Work Full-Time.  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   Approved 6 weeks off, has not returned yet due to pain     PAIN    Location Across low back; up center of back to just below the shoulder blades; below sternum with sitting upright; gas pressure as bowels have not been moving regularly which gives pain down in the lower abdomen    Description Tightness; stiffness    Increased by Showering, getting dressed    Decreased by Leaning over the couch and bending the knees, leaning forward and putting arms up   Maximum Intensity 5/10   Best Intensity 1/10   Todays Rating 4/10   Other/Function      SEXUAL /MENSTRUAL HISTORY [] Deferred

## 2024-03-20 ENCOUNTER — HOSPITAL ENCOUNTER (OUTPATIENT)
Dept: PHYSICAL THERAPY | Age: 49
Setting detail: THERAPIES SERIES
Discharge: HOME | End: 2024-03-20
Payer: COMMERCIAL

## 2024-03-20 PROCEDURE — 97110 THERAPEUTIC EXERCISES: CPT

## 2024-03-20 PROCEDURE — 97140 MANUAL THERAPY 1/> REGIONS: CPT

## 2024-03-20 NOTE — PROGRESS NOTES
Showering, getting dressed    Decreased by Leaning over the couch and bending the knees, leaning forward and putting arms up   Maximum Intensity 5/10   Best Intensity 1/10   Todays Rating 4/10   Other/Function      SEXUAL /MENSTRUAL HISTORY [] Deferred secondary to:    Age of First Menstrual Period 14   Are Cycles Regular yes    Pain During Menses no   Number of Pregnancies 1 - a few miscarries    Number of Vaginal Deliveries 1   Number of Caesarean Deliveries 0     BLADDER ASSESSMENT [] Deferred secondary to:   Daily Fluid Ingestion: 2 bottles water recently (used to be 8-10 before surgery), occasional tea, occasional tea, occasional sprite     Urination Frequency Times/Day: every few hours  Times/Night: 0   Volume Large and Medium   Urge Sensation Normal    SYMPTOM QUESTIONNAIRE   Loses Urine Upon: NA   Incontinence Volume: NA   Frequency of Leakage: NA   Wets the Bed: no   Burning/Pain with Urination: no   Difficulty Starting a Stream of Urine: no   Incomplete Emptying No   Strain to Empty Bladder: no   “Falling Out” Feeling: no   Urinate more than 7 times/day: no   Use a form of Leakage Protection: no   Restrict Fluid Intake: no   Stream Strength Average       BOWEL ASSESSMENT [] Deferred secondary to:   Frequency: Every few days; used to be daily   Most Common Stool Consistency: [place] 2   SYMPTOM QUESTIONNAIRE   Strain to have a BM: no   Include fiber in your diet: yes: fiber supplements    Take laxatives/enemas regularly: yes: Colace PRN; Doculax    Pain with BM: yes   Strong urge to have BM: no   Leak/Stain Feces: no   Diarrhea often: no         SEXUAL ASSESSMENT [] Deferred secondary to:   Sexually Active No - post op   Pain with [place] (Dyspareunia) No pain reported         VITALS [] Deferred secondary to:   Height 52\"   Weight 153#       GENERAL ASSESSMENT   [] Deferred secondary to:   Palpation Tender to palp and tightness noted at B iliopsoas with L>R; tender throughout B alice from L1 through

## 2024-03-26 ENCOUNTER — HOSPITAL ENCOUNTER (OUTPATIENT)
Dept: PHYSICAL THERAPY | Age: 49
Setting detail: THERAPIES SERIES
Discharge: HOME | End: 2024-03-26
Payer: COMMERCIAL

## 2024-03-26 PROCEDURE — 97140 MANUAL THERAPY 1/> REGIONS: CPT

## 2024-03-26 PROCEDURE — 97110 THERAPEUTIC EXERCISES: CPT

## 2024-03-26 NOTE — PROGRESS NOTES
activity tolerance, Impaired ROM, Impaired strength, and Pain  Prognosis: Excellent    GOALS:  Patient Goal: to have less pain    Short Term Goals:  Time Frame: 4 weeks  Patient will report reduced back pain at rest from 1/10 to 0/10 in order to increase ability to sleep at night.  Patient will increase standing tolerance to 15 minutes in order to complete household activities and ADLs.  Patient will report ability to sit upright without upper abdominal pain in order to complete a 4 hour work shift.   Patient will be independent with basic HEP for lumbar and gross hip girdle stretching.  Patient to demonstrate normal stool consistency 90% of the time with 90% improvement of sensation of complete defecation due to improved toileting and dietary habits along with improving PRM control.  This pt will demo a good understanding of proper toilet posture to decrease PFM tone and to improve visceral alignment for increased ease of defecation and reduced pelvic organ strain.        Long Term Goals:  Time Frame: 12 weeks  Patient will report reduced pain levels at worst from 5/10 to 1/10 in order to return to work full time.   Patient will demonstrate reduced fear avoidance behaviors and be able to perform transfers and all functional mobility tasks at a normalized pace without slow cautious movements.   Patient will be independent with progressed HEP to include core strengthening.  Patient will Increase abdominal strength of 4/5 or greater with habitual inferior to superior approach to allow for increased pelvic organ support.  Patient's Modified HUDSON Score will improve to 10/50 or less to demonstrate functional improvement in condition.      PLAN:  Treatment Recommendations: Strengthening, Range of Motion, Functional Mobility Training, Transfer Training, Endurance Training, Neuromuscular Re-education, Manual Therapy - Soft Tissue Mobilization, Pain Management, Home Exercise Program, Patient Education, Safety Education and

## 2024-03-28 ENCOUNTER — HOSPITAL ENCOUNTER (OUTPATIENT)
Dept: PHYSICAL THERAPY | Age: 49
Setting detail: THERAPIES SERIES
Discharge: HOME | End: 2024-03-28
Payer: COMMERCIAL

## 2024-03-28 PROCEDURE — 97140 MANUAL THERAPY 1/> REGIONS: CPT

## 2024-03-28 PROCEDURE — 97110 THERAPEUTIC EXERCISES: CPT

## 2024-03-28 NOTE — PROGRESS NOTES
[facility]  PHYSICAL THERAPY  OUTPATIENT REHABILITATION - SPECIALIZED THERAPY SERVICES  [] PELVIC HEALTH EVALUATION  [x] DAILY NOTE  [] PROGRESS NOTE [] DISCHARGE NOTE    Date: 3/28/2024  Patient Name:  Katerin Perry  : 1975  MRN: [id number]  CSN: [id number]    Referring Practitioner Joy Rey MD   Diagnosis Low back pain, unspecified   Treatment Diagnosis M62.89 - Other Specified Disorders of Muscle  M62.81 - Muscle Weakness (Generalized)  M54.5 - Low Back Pain  M62.830 - Muscle Spasms of Back, K55.09 - Other Constipation, and R10.30 - Lower Abdominal Pain, Unspecified  L90.5 - Scar Conditions and Fibrosis of Skin  M79.10 - Myalgia, Unspecified Site   Date of Evaluation 24    Additional Pertinent History HTN, ectopic pregnancy , hysterectomy       Functional Outcome Measure Used Modified HUDSON   Functional Outcome Score 30/50 (24)       Insurance: Primary: Payor: [employer] /  /  / ,   Secondary: UMR   Authorization Information: No precert required   Visit # 8, 8/10 for progress note (Reporting Period: 24 to     )   Visits Allowed: Allowed 25 visits per calendar year.  0 visits have been used. FOR CPT CODE 45760 AQUATIC ONLY 10 VISITS ALLOWED. Soft Max.   Recertification Date: 24   Physician Follow-Up: Mid March   Physician Orders:    History of Present Illness: Pt is a 49 year old female s/p hysterectomy on 2/15/24. Pt reports that her muscles in her lower back feel very tight. She states that she can not get them to relax and it is very uncomfortable. States that she is sitting and laying a lot and feels like she is very stiff. Bathing and getting dressed both are uncomfortable for her with bending forward. Hysterectomy was done due to abnormal cells in the cervix and it was decided to remove the uterus to avoid any complications. Ovaries remain intact at this time. She is on a 10# weight lifting restriction at this time. Had f/u on  and was

## 2024-04-02 ENCOUNTER — HOSPITAL ENCOUNTER (OUTPATIENT)
Dept: PHYSICAL THERAPY | Age: 49
Setting detail: THERAPIES SERIES
Discharge: HOME | End: 2024-04-02
Payer: COMMERCIAL

## 2024-04-02 PROCEDURE — 97140 MANUAL THERAPY 1/> REGIONS: CPT

## 2024-04-02 PROCEDURE — 97530 THERAPEUTIC ACTIVITIES: CPT

## 2024-04-02 NOTE — PROGRESS NOTES
without slow cautious movements.   Patient will be independent with progressed HEP to include core strengthening.  Patient will Increase abdominal strength of 4/5 or greater with habitual inferior to superior approach to allow for increased pelvic organ support.  Patient's Modified HUDSON Score will improve to 10/50 or less to demonstrate functional improvement in condition.      PLAN:  Treatment Recommendations: Strengthening, Range of Motion, Functional Mobility Training, Transfer Training, Endurance Training, Neuromuscular Re-education, Manual Therapy - Soft Tissue Mobilization, Pain Management, Home Exercise Program, Patient Education, Safety Education and Training, and Integrative Dry Needling    []  Plan of care initiated.  Plan to see patient 1 times per week for 12 weeks to address the treatment planned outlined above.  [x]  Continue with current plan of care  []  Modify plan of care as follows:    []  Hold pending physician visit  []  Discharge    Time In 7:00   Time Out 8:00   Timed Code Minutes: 60 min   Total Treatment Time: 60 min       Electronically Signed by: Anny Hudson PT

## 2024-04-04 ENCOUNTER — HOSPITAL ENCOUNTER (OUTPATIENT)
Dept: PHYSICAL THERAPY | Age: 49
Setting detail: THERAPIES SERIES
Discharge: HOME | End: 2024-04-04
Payer: COMMERCIAL

## 2024-04-04 PROCEDURE — 97110 THERAPEUTIC EXERCISES: CPT

## 2024-04-04 PROCEDURE — 97140 MANUAL THERAPY 1/> REGIONS: CPT

## 2024-04-04 NOTE — PROGRESS NOTES
Swiss ball roll outs forward/lateral or table slides 3x30 sec  x    Wolfgang stretch 3x30 sec  x    Sidelying QL stretch 3x30 sec  x           Tummy tuck 10  x           PPT 10 5 sec x    PPT alt arms 10  x    PPT leg march 10  x    PPT opposites 10  x      Specific Interventions Next Treatment: lumbar STM, abdominal STM once bruising is improved, abdominal massage for constipation once incisions are healed; gross hip girdle stretching, core strengthening once pain is managed     Activity/Treatment Tolerance:  [x]  Patient tolerated treatment well  []  Patient limited by fatigue  []  Patient limited by pain   []  Patient limited by medical complications  []  Other:     Patient Education:   [x]  HEP/Education Completed: Patient to initiate core strengthening program once per day as noted above. Pt to continue with stretching and increase to twice per day. Handout provided.  [employer] Access Code:  []  No new Education completed  [x]  Reviewed Prior HEP      [x]  Patient verbalized and/or demonstrated understanding of education provided.  []  Patient unable to verbalize and/or demonstrate understanding of education provided.  Will continue education.  []  Barriers to learning: none    Assessment: Pt with reports of reduced muscle spasms; however, when they do occur she states that they are lasting for a longer period of time. Low back spasms do seem to be reducing; however, upper back muscles and ribs continue to be the most tender. Pt doing core exercises once a day without any increased pain. No RLQ pain to report today. Performed dry needling to lumbar and thoracic paraspinals to reduce spasms. Followed dry needling with cupping and DTM. Performed Graston techniques to B rib cage and intercostals due to spasms and pain/difficulty taking a breath when spasms occur. Plan to perform manual therapy to control pain as needed and progress core strengthening next session if pt tolerates core exercises twice a day without

## 2024-04-09 ENCOUNTER — HOSPITAL ENCOUNTER (OUTPATIENT)
Dept: PHYSICAL THERAPY | Age: 49
Setting detail: THERAPIES SERIES
Discharge: HOME | End: 2024-04-09
Payer: COMMERCIAL

## 2024-04-09 PROCEDURE — 97530 THERAPEUTIC ACTIVITIES: CPT

## 2024-04-09 PROCEDURE — 97140 MANUAL THERAPY 1/> REGIONS: CPT

## 2024-04-09 NOTE — PROGRESS NOTES
[facility]  PHYSICAL THERAPY  OUTPATIENT REHABILITATION - SPECIALIZED THERAPY SERVICES  [] PELVIC HEALTH EVALUATION  [] DAILY NOTE  [x] PROGRESS NOTE [] DISCHARGE NOTE    Date: 2024  Patient Name:  Katerin Perry  : 1975  MRN: [id number]  CSN: [id number]    Referring Practitioner Joy Rey MD   Diagnosis Low back pain, unspecified   Treatment Diagnosis M62.89 - Other Specified Disorders of Muscle  M62.81 - Muscle Weakness (Generalized)  M54.5 - Low Back Pain  M62.830 - Muscle Spasms of Back, K55.09 - Other Constipation, and R10.30 - Lower Abdominal Pain, Unspecified  L90.5 - Scar Conditions and Fibrosis of Skin  M79.10 - Myalgia, Unspecified Site   Date of Evaluation 24    Additional Pertinent History HTN, ectopic pregnancy , hysterectomy       Functional Outcome Measure Used Modified HUDSON   Functional Outcome Score 30/50 (24)    21/50 (24)      Insurance: Primary: Payor: [email or web address] /  /  / ,   Secondary: UMR   Authorization Information: No precert required   Visit # 11, 1/10 for progress note (Reporting Period: 24 to     )   Visits Allowed: Allowed 25 visits per calendar year.  0 visits have been used. FOR CPT CODE 33176 AQUATIC ONLY 10 VISITS ALLOWED. Soft Max.   Recertification Date: 24   Physician Follow-Up: Mid March   Physician Orders:    History of Present Illness: Pt is a 49 year old female s/p hysterectomy on 2/15/24. Pt reports that her muscles in her lower back feel very tight. She states that she can not get them to relax and it is very uncomfortable. States that she is sitting and laying a lot and feels like she is very stiff. Bathing and getting dressed both are uncomfortable for her with bending forward. Hysterectomy was done due to abnormal cells in the cervix and it was decided to remove the uterus to avoid any complications. Ovaries remain intact at this time. She is on a 10# weight lifting restriction at this time. Had

## 2024-04-15 ENCOUNTER — HOSPITAL ENCOUNTER (OUTPATIENT)
Dept: PHYSICAL THERAPY | Age: 49
Setting detail: THERAPIES SERIES
Discharge: HOME | End: 2024-04-15
Payer: COMMERCIAL

## 2024-04-15 PROCEDURE — 97140 MANUAL THERAPY 1/> REGIONS: CPT

## 2024-04-15 PROCEDURE — 97110 THERAPEUTIC EXERCISES: CPT

## 2024-04-15 NOTE — PROGRESS NOTES
[facility]  PHYSICAL THERAPY  OUTPATIENT REHABILITATION - SPECIALIZED THERAPY SERVICES  [] PELVIC HEALTH EVALUATION  [x] DAILY NOTE  [] PROGRESS NOTE [] DISCHARGE NOTE    Date: 4/15/2024  Patient Name:  Katerin Perry  : 1975  MRN: [id number]  CSN: [id number]    Referring Practitioner Joy Rey MD   Diagnosis Low back pain, unspecified   Treatment Diagnosis M62.89 - Other Specified Disorders of Muscle  M62.81 - Muscle Weakness (Generalized)  M54.5 - Low Back Pain  M62.830 - Muscle Spasms of Back, K55.09 - Other Constipation, and R10.30 - Lower Abdominal Pain, Unspecified  L90.5 - Scar Conditions and Fibrosis of Skin  M79.10 - Myalgia, Unspecified Site   Date of Evaluation 24    Additional Pertinent History HTN, ectopic pregnancy , hysterectomy       Functional Outcome Measure Used Modified HUDSON   Functional Outcome Score 30/50 (24)    21/50 (24)      Insurance: Primary: Payor: [employer] /  /  / ,   Secondary: UMR   Authorization Information: No precert required   Visit # 12, 2/10 for progress note (Reporting Period: 24 to     )   Visits Allowed: Allowed 25 visits per calendar year.  0 visits have been used. FOR CPT CODE 78384 AQUATIC ONLY 10 VISITS ALLOWED. Soft Max.   Recertification Date: 24   Physician Follow-Up: Mid March   Physician Orders:    History of Present Illness: Pt is a 49 year old female s/p hysterectomy on 2/15/24. Pt reports that her muscles in her lower back feel very tight. She states that she can not get them to relax and it is very uncomfortable. States that she is sitting and laying a lot and feels like she is very stiff. Bathing and getting dressed both are uncomfortable for her with bending forward. Hysterectomy was done due to abnormal cells in the cervix and it was decided to remove the uterus to avoid any complications. Ovaries remain intact at this time. She is on a 10# weight lifting restriction at this time. Had

## 2024-04-18 ENCOUNTER — HOSPITAL ENCOUNTER (OUTPATIENT)
Dept: PHYSICAL THERAPY | Age: 49
Setting detail: THERAPIES SERIES
Discharge: HOME | End: 2024-04-18
Payer: COMMERCIAL

## 2024-04-18 PROCEDURE — 97140 MANUAL THERAPY 1/> REGIONS: CPT

## 2024-04-18 PROCEDURE — 97110 THERAPEUTIC EXERCISES: CPT

## 2024-04-18 NOTE — PROGRESS NOTES
[facility]  PHYSICAL THERAPY  OUTPATIENT REHABILITATION - SPECIALIZED THERAPY SERVICES  [] PELVIC HEALTH EVALUATION  [x] DAILY NOTE  [] PROGRESS NOTE [] DISCHARGE NOTE    Date: 2024  Patient Name:  Katerin Perry  : 1975  MRN: [id number]  CSN: [id number]    Referring Practitioner Joy Rey MD   Diagnosis Low back pain, unspecified   Treatment Diagnosis M62.89 - Other Specified Disorders of Muscle  M62.81 - Muscle Weakness (Generalized)  M54.5 - Low Back Pain  M62.830 - Muscle Spasms of Back, K55.09 - Other Constipation, and R10.30 - Lower Abdominal Pain, Unspecified  L90.5 - Scar Conditions and Fibrosis of Skin  M79.10 - Myalgia, Unspecified Site   Date of Evaluation 24    Additional Pertinent History HTN, ectopic pregnancy , hysterectomy       Functional Outcome Measure Used Modified HUDSON   Functional Outcome Score 30/50 (24)    21/50 (24)      Insurance: Primary: Payor: [employer] /  /  / ,   Secondary: UMR   Authorization Information: No precert required   Visit # 13, 3/10 for progress note (Reporting Period: 24 to     )   Visits Allowed: Allowed 25 visits per calendar year.  0 visits have been used. FOR CPT CODE 13818 AQUATIC ONLY 10 VISITS ALLOWED. Soft Max.   Recertification Date: 24   Physician Follow-Up: Mid March   Physician Orders:    History of Present Illness: Pt is a 49 year old female s/p hysterectomy on 2/15/24. Pt reports that her muscles in her lower back feel very tight. She states that she can not get them to relax and it is very uncomfortable. States that she is sitting and laying a lot and feels like she is very stiff. Bathing and getting dressed both are uncomfortable for her with bending forward. Hysterectomy was done due to abnormal cells in the cervix and it was decided to remove the uterus to avoid any complications. Ovaries remain intact at this time. She is on a 10# weight lifting restriction at this time. Had

## 2024-04-22 ENCOUNTER — HOSPITAL ENCOUNTER (OUTPATIENT)
Dept: PHYSICAL THERAPY | Age: 49
Setting detail: THERAPIES SERIES
Discharge: HOME | End: 2024-04-22
Payer: COMMERCIAL

## 2024-04-22 PROCEDURE — 97110 THERAPEUTIC EXERCISES: CPT

## 2024-04-22 PROCEDURE — 97140 MANUAL THERAPY 1/> REGIONS: CPT

## 2024-04-22 NOTE — PROGRESS NOTES
[facility]  PHYSICAL THERAPY  OUTPATIENT REHABILITATION - SPECIALIZED THERAPY SERVICES  [] PELVIC HEALTH EVALUATION  [x] DAILY NOTE  [] PROGRESS NOTE [] DISCHARGE NOTE  [x] [facility]    Date: 2024  Patient Name:  Katerin Perry  : 1975  MRN: [id number]  CSN: [id number]    Referring Practitioner Joy Rey MD   Diagnosis Low back pain, unspecified   Treatment Diagnosis M62.89 - Other Specified Disorders of Muscle  M62.81 - Muscle Weakness (Generalized)  M54.5 - Low Back Pain  M62.830 - Muscle Spasms of Back, K55.09 - Other Constipation, and R10.30 - Lower Abdominal Pain, Unspecified  L90.5 - Scar Conditions and Fibrosis of Skin  M79.10 - Myalgia, Unspecified Site   Date of Evaluation 24    Additional Pertinent History HTN, ectopic pregnancy , hysterectomy       Functional Outcome Measure Used Modified HUDSON   Functional Outcome Score 30/50 (24)    21/50 (24)      Insurance: Primary: Payor: [employer] /  /  / ,   Secondary: UMR   Authorization Information: No precert required   Visit # 14, 4/10 for progress note (Reporting Period: 24 to     )   Visits Allowed: Allowed 25 visits per calendar year.  0 visits have been used. FOR CPT CODE 61140 AQUATIC ONLY 10 VISITS ALLOWED. Soft Max.   Recertification Date: 24   Physician Follow-Up: Mid March   Physician Orders:    History of Present Illness: Pt is a 49 year old female s/p hysterectomy on 2/15/24. Pt reports that her muscles in her lower back feel very tight. She states that she can not get them to relax and it is very uncomfortable. States that she is sitting and laying a lot and feels like she is very stiff. Bathing and getting dressed both are uncomfortable for her with bending forward. Hysterectomy was done due to abnormal cells in the cervix and it was decided to remove the uterus to avoid any complications. Ovaries remain intact at this time. She is on a 10# weight lifting restriction at

## 2024-04-25 ENCOUNTER — HOSPITAL ENCOUNTER (OUTPATIENT)
Dept: PHYSICAL THERAPY | Age: 49
Setting detail: THERAPIES SERIES
Discharge: HOME | End: 2024-04-25
Payer: COMMERCIAL

## 2024-04-25 PROCEDURE — 97140 MANUAL THERAPY 1/> REGIONS: CPT

## 2024-04-25 PROCEDURE — 97110 THERAPEUTIC EXERCISES: CPT

## 2024-04-25 PROCEDURE — 97112 NEUROMUSCULAR REEDUCATION: CPT

## 2024-04-25 NOTE — PROGRESS NOTES
learning: none    Assessment: Pt with significant increase in muscle spasms after Monday's PT session. States that she did stretches only that day. Spasms did reduce Tuesday. Unsure what the cause of increased spasms was but did return to stretching HEP only. Unsure if kinesiology tape was helpful or not due to increase spasms. Tried kinesiology tape again today.  Performed dry needling to lumbar and thoracic paraspinals with tightness noted at R lumbar para's. Graston to intercostals with erythematic response and pt reporting tenderness bonifacio at lateral aspect of ribcage and near diaphragm. Performed cupping to thoracic paraspinals today and around lateral aspects of ribcage to help further reduce pain levels. Continue to progress as tolerated.     Body Structures/Functions/Activity Limitations: Abdominal and core weakness, Decreased awareness of safe means of improving abdom strength and stability, Limited hip girdle flexibility, Impaired activity tolerance, Impaired ROM, Impaired strength, and Pain  Prognosis: Excellent    GOALS:  Patient Goal: to have less pain    Short Term Goals:  Time Frame: 4 weeks  Patient will report reduced back pain at rest from 1/10 to 0/10 in order to increase ability to sleep at night.  GOAL MET  Patient will increase standing tolerance to 15 minutes in order to complete household activities and ADLs. GOAL MET  Patient will report ability to sit upright without upper abdominal pain in order to complete a 4 hour work shift. GOAL MET  Patient will be independent with basic HEP for lumbar and gross hip girdle stretching. GOAL MET  Patient to demonstrate normal stool consistency 90% of the time with 90% improvement of sensation of complete defecation due to improved toileting and dietary habits along with improving PRM control. GOAL MET  This pt will demo a good understanding of proper toilet posture to decrease PFM tone and to improve visceral alignment for increased ease of defecation and

## 2024-04-29 ENCOUNTER — HOSPITAL ENCOUNTER (OUTPATIENT)
Dept: PHYSICAL THERAPY | Age: 49
Setting detail: THERAPIES SERIES
Discharge: HOME | End: 2024-04-29
Payer: COMMERCIAL

## 2024-04-29 PROCEDURE — 97110 THERAPEUTIC EXERCISES: CPT

## 2024-04-29 PROCEDURE — 97112 NEUROMUSCULAR REEDUCATION: CPT

## 2024-04-29 PROCEDURE — 97140 MANUAL THERAPY 1/> REGIONS: CPT

## 2024-04-29 NOTE — PROGRESS NOTES
[facility]  PHYSICAL THERAPY  OUTPATIENT REHABILITATION - SPECIALIZED THERAPY SERVICES  [] PELVIC HEALTH EVALUATION  [x] DAILY NOTE  [] PROGRESS NOTE [] DISCHARGE NOTE  [x] [facility]    Date: 2024  Patient Name:  Katerin Perry  : 1975  MRN: [id number]  CSN: [id number]    Referring Practitioner Joy Rey MD   Diagnosis Low back pain, unspecified   Treatment Diagnosis M62.89 - Other Specified Disorders of Muscle  M62.81 - Muscle Weakness (Generalized)  M54.5 - Low Back Pain  M62.830 - Muscle Spasms of Back, K55.09 - Other Constipation, and R10.30 - Lower Abdominal Pain, Unspecified  L90.5 - Scar Conditions and Fibrosis of Skin  M79.10 - Myalgia, Unspecified Site   Date of Evaluation 24    Additional Pertinent History HTN, ectopic pregnancy , hysterectomy       Functional Outcome Measure Used Modified HUDSON   Functional Outcome Score 30/50 (24)    21/50 (24)      Insurance: Primary: Payor: [employer] /  /  / ,   Secondary: UMR   Authorization Information: No precert required   Visit # 16, 6/10 for progress note (Reporting Period: 24 to     )   Visits Allowed: Allowed 25 visits per calendar year.  0 visits have been used. FOR CPT CODE 20742 AQUATIC ONLY 10 VISITS ALLOWED. Soft Max.   Recertification Date: 24   Physician Follow-Up: Mid March   Physician Orders:    History of Present Illness: Pt is a 49 year old female s/p hysterectomy on 2/15/24. Pt reports that her muscles in her lower back feel very tight. She states that she can not get them to relax and it is very uncomfortable. States that she is sitting and laying a lot and feels like she is very stiff. Bathing and getting dressed both are uncomfortable for her with bending forward. Hysterectomy was done due to abnormal cells in the cervix and it was decided to remove the uterus to avoid any complications. Ovaries remain intact at this time. She is on a 10# weight lifting restriction at

## 2024-05-07 ENCOUNTER — HOSPITAL ENCOUNTER (OUTPATIENT)
Dept: PHYSICAL THERAPY | Age: 49
Setting detail: THERAPIES SERIES
Discharge: HOME | End: 2024-05-07
Payer: COMMERCIAL

## 2024-05-07 PROCEDURE — 97110 THERAPEUTIC EXERCISES: CPT | Performed by: PHYSICAL THERAPIST

## 2024-05-07 PROCEDURE — 97140 MANUAL THERAPY 1/> REGIONS: CPT | Performed by: PHYSICAL THERAPIST

## 2024-05-07 NOTE — PROGRESS NOTES
[facility]  PHYSICAL THERAPY  OUTPATIENT REHABILITATION - SPECIALIZED THERAPY SERVICES  [] PELVIC HEALTH EVALUATION  [x] DAILY NOTE  [] PROGRESS NOTE [] DISCHARGE NOTE  [] [facility]    Date: 2024  Patient Name:  Katerin Perry  : 1975  MRN: [id number]  CSN: [id number]    Referring Practitioner Joy Rey MD   Diagnosis Low back pain, unspecified   Treatment Diagnosis M62.89 - Other Specified Disorders of Muscle  M62.81 - Muscle Weakness (Generalized)  M54.5 - Low Back Pain  M62.830 - Muscle Spasms of Back, K55.09 - Other Constipation, and R10.30 - Lower Abdominal Pain, Unspecified  L90.5 - Scar Conditions and Fibrosis of Skin  M79.10 - Myalgia, Unspecified Site   Date of Evaluation 24    Additional Pertinent History HTN, ectopic pregnancy , hysterectomy       Functional Outcome Measure Used Modified HUDSON   Functional Outcome Score 30/50 (24)    21/50 (24)      Insurance: Primary: Payor: [employer] /  /  / ,   Secondary: UMR   Authorization Information: No precert required   Visit # 17, 7/10 for progress note (Reporting Period: 24 to     )   Visits Allowed: Allowed 25 visits per calendar year.  0 visits have been used. FOR CPT CODE 12097 AQUATIC ONLY 10 VISITS ALLOWED. Soft Max.   Recertification Date: 24   Physician Follow-Up: Mid March   Physician Orders:    History of Present Illness: Pt is a 49 year old female s/p hysterectomy on 2/15/24. Pt reports that her muscles in her lower back feel very tight. She states that she can not get them to relax and it is very uncomfortable. States that she is sitting and laying a lot and feels like she is very stiff. Bathing and getting dressed both are uncomfortable for her with bending forward. Hysterectomy was done due to abnormal cells in the cervix and it was decided to remove the uterus to avoid any complications. Ovaries remain intact at this time. She is on a 10# weight lifting restriction at

## 2024-05-14 ENCOUNTER — HOSPITAL ENCOUNTER (OUTPATIENT)
Dept: PHYSICAL THERAPY | Age: 49
Setting detail: THERAPIES SERIES
Discharge: HOME | End: 2024-05-14
Payer: COMMERCIAL

## 2024-05-14 PROCEDURE — 97530 THERAPEUTIC ACTIVITIES: CPT

## 2024-05-14 PROCEDURE — 97140 MANUAL THERAPY 1/> REGIONS: CPT

## 2024-05-14 PROCEDURE — 97110 THERAPEUTIC EXERCISES: CPT

## 2024-05-14 NOTE — PROGRESS NOTES
[facility] [place]  PHYSICAL THERAPY  OUTPATIENT REHABILITATION - SPECIALIZED THERAPY SERVICES  [] PELVIC HEALTH EVALUATION  [x] DAILY NOTE  [] PROGRESS NOTE [] DISCHARGE NOTE  [] [facility]    Date: 2024  Patient Name:  Katerin Perry  : 1975  MRN: [id number]  CSN: [id number]    Referring Practitioner Joy Rey MD   Diagnosis Low back pain, unspecified   Treatment Diagnosis M62.89 - Other Specified Disorders of Muscle  M62.81 - Muscle Weakness (Generalized)  M54.5 - Low Back Pain  M62.830 - Muscle Spasms of Back, K55.09 - Other Constipation, and R10.30 - Lower Abdominal Pain, Unspecified  L90.5 - Scar Conditions and Fibrosis of Skin  M79.10 - Myalgia, Unspecified Site   Date of Evaluation 24    Additional Pertinent History HTN, ectopic pregnancy , hysterectomy       Functional Outcome Measure Used Modified HUDSON   Functional Outcome Score 30/50 (24)    21/50 (24)      Insurance: Primary: Payor: [employer] /  /  / ,   Secondary: UMR   Authorization Information: No precert required   Visit # 17, 7/10 for progress note (Reporting Period: 24 to     )   Visits Allowed: Allowed 25 visits per calendar year.  0 visits have been used. FOR CPT CODE 10838 AQUATIC ONLY 10 VISITS ALLOWED. Soft Max.   Recertification Date: 24   Physician Follow-Up: Mid March   Physician Orders:    History of Present Illness: Pt is a 49 year old female s/p hysterectomy on 2/15/24. Pt reports that her muscles in her lower back feel very tight. She states that she can not get them to relax and it is very uncomfortable. States that she is sitting and laying a lot and feels like she is very stiff. Bathing and getting dressed both are uncomfortable for her with bending forward. Hysterectomy was done due to abnormal cells in the cervix and it was decided to remove the uterus to avoid any complications. Ovaries remain intact at this time. She is on a 10# weight lifting restriction at

## 2024-05-16 ENCOUNTER — HOSPITAL ENCOUNTER (OUTPATIENT)
Dept: PHYSICAL THERAPY | Age: 49
Setting detail: THERAPIES SERIES
Discharge: HOME | End: 2024-05-16
Payer: COMMERCIAL

## 2024-05-16 PROCEDURE — 97140 MANUAL THERAPY 1/> REGIONS: CPT

## 2024-05-16 PROCEDURE — 97530 THERAPEUTIC ACTIVITIES: CPT

## 2024-05-16 PROCEDURE — 97110 THERAPEUTIC EXERCISES: CPT

## 2024-05-16 NOTE — PROGRESS NOTES
getting on the ground last night to perform HEP. Instructed to perform FERNANDO unresisted wall stretch and posterior shoulder stretching prior to getting on ground to perform the rest of HEP. Performed dry needling to thoracic paraspinals with some tenting. Followed with STM to thoracic paraspinals, rhomboids, infraspinatus, supraspinatus, lumbar paraspinals and Graston for thoracic alice and intercostals with pt tolerating well. Progress core strengthening next session if spasms continue to reduce.     Body Structures/Functions/Activity Limitations: Abdominal and core weakness, Decreased awareness of safe means of improving abdom strength and stability, Limited hip girdle flexibility, Impaired activity tolerance, Impaired ROM, Impaired strength, and Pain  Prognosis: Excellent    GOALS:  Patient Goal: to have less pain    Short Term Goals:  Time Frame: 4 weeks  Patient will report reduced back pain at rest from 1/10 to 0/10 in order to increase ability to sleep at night.  GOAL MET  Patient will increase standing tolerance to 15 minutes in order to complete household activities and ADLs. GOAL MET  Patient will report ability to sit upright without upper abdominal pain in order to complete a 4 hour work shift. GOAL MET  Patient will be independent with basic HEP for lumbar and gross hip girdle stretching. GOAL MET  Patient to demonstrate normal stool consistency 90% of the time with 90% improvement of sensation of complete defecation due to improved toileting and dietary habits along with improving PRM control. GOAL MET  This pt will demo a good understanding of proper toilet posture to decrease PFM tone and to improve visceral alignment for increased ease of defecation and reduced pelvic organ strain.    GOAL MET    Long Term Goals:  Time Frame: 12 weeks  Patient will report reduced pain levels at worst from 5/10 to 1/10 in order to return to work full time. GOAL NOT MET  Patient will demonstrate reduced fear avoidance

## 2024-05-20 ENCOUNTER — HOSPITAL ENCOUNTER (OUTPATIENT)
Dept: PHYSICAL THERAPY | Age: 49
Setting detail: THERAPIES SERIES
Discharge: HOME | End: 2024-05-20
Payer: COMMERCIAL

## 2024-05-20 PROCEDURE — 97110 THERAPEUTIC EXERCISES: CPT

## 2024-05-20 PROCEDURE — 97140 MANUAL THERAPY 1/> REGIONS: CPT

## 2024-05-20 NOTE — PROGRESS NOTES
Performed DN to thoracic paraspinals with pt tolerating well. It does feel that 2 ribs on the R side may be out of place today with palpation when performing manual therapy to thoracic spine. Some bruising also present from Graston treatment last session. Utilized less pressure with Graston today and utilized hands for manual therapy to reduce any further bruising. Plan to progress core strengthening next session if pt has continued to report improvement in muscle spasm occurrence and intensity.     Body Structures/Functions/Activity Limitations: Abdominal and core weakness, Decreased awareness of safe means of improving abdom strength and stability, Limited hip girdle flexibility, Impaired activity tolerance, Impaired ROM, Impaired strength, and Pain  Prognosis: Excellent    GOALS:  Patient Goal: to have less pain    Short Term Goals:  Time Frame: 4 weeks  Patient will report reduced back pain at rest from 1/10 to 0/10 in order to increase ability to sleep at night.  GOAL MET  Patient will increase standing tolerance to 15 minutes in order to complete household activities and ADLs. GOAL MET  Patient will report ability to sit upright without upper abdominal pain in order to complete a 4 hour work shift. GOAL MET  Patient will be independent with basic HEP for lumbar and gross hip girdle stretching. GOAL MET  Patient to demonstrate normal stool consistency 90% of the time with 90% improvement of sensation of complete defecation due to improved toileting and dietary habits along with improving PRM control. GOAL MET  This pt will demo a good understanding of proper toilet posture to decrease PFM tone and to improve visceral alignment for increased ease of defecation and reduced pelvic organ strain.    GOAL MET    Long Term Goals:  Time Frame: 12 weeks  Patient will report reduced pain levels at worst from 5/10 to 1/10 in order to return to work full time. GOAL NOT MET  Patient will demonstrate reduced fear avoidance

## 2024-05-23 ENCOUNTER — HOSPITAL ENCOUNTER (OUTPATIENT)
Dept: PHYSICAL THERAPY | Age: 49
Setting detail: THERAPIES SERIES
Discharge: HOME | End: 2024-05-23
Payer: COMMERCIAL

## 2024-05-23 PROCEDURE — 97110 THERAPEUTIC EXERCISES: CPT

## 2024-05-23 PROCEDURE — 97140 MANUAL THERAPY 1/> REGIONS: CPT

## 2024-05-23 PROCEDURE — 97112 NEUROMUSCULAR REEDUCATION: CPT

## 2024-05-23 PROCEDURE — 97530 THERAPEUTIC ACTIVITIES: CPT

## 2024-05-23 NOTE — PROGRESS NOTES
[facility]  PHYSICAL THERAPY  OUTPATIENT REHABILITATION - SPECIALIZED THERAPY SERVICES  [] PELVIC HEALTH EVALUATION  [] DAILY NOTE  [x] PROGRESS NOTE [] DISCHARGE NOTE  [] [facility]    Date: 2024  Patient Name:  Katerin Perry  : 1975  MRN: [id number]  CSN: [id number]    Referring Practitioner Joy Rey MD   Diagnosis Low back pain, unspecified   Treatment Diagnosis M62.89 - Other Specified Disorders of Muscle  M62.81 - Muscle Weakness (Generalized)  M54.5 - Low Back Pain  M62.830 - Muscle Spasms of Back, K55.09 - Other Constipation, and R10.30 - Lower Abdominal Pain, Unspecified  L90.5 - Scar Conditions and Fibrosis of Skin  M79.10 - Myalgia, Unspecified Site   Date of Evaluation 24    Additional Pertinent History HTN, ectopic pregnancy , hysterectomy       Functional Outcome Measure Used Modified HUDSON   Functional Outcome Score 30/50 (24)    21/50 (24)      Insurance: Primary: Payor: [employer] /  /  / ,   Secondary: UMR   Authorization Information: No precert required   Visit # 10, 1/10 for progress note (Reporting Period: 24 to     )   Visits Allowed: Allowed 25 visits per calendar year.  0 visits have been used. FOR CPT CODE 20824 AQUATIC ONLY 10 VISITS ALLOWED. Soft Max.   Recertification Date: 24   Physician Follow-Up: Mid March   Physician Orders:    History of Present Illness: Pt is a 49 year old female s/p hysterectomy on 2/15/24. Pt reports that her muscles in her lower back feel very tight. She states that she can not get them to relax and it is very uncomfortable. States that she is sitting and laying a lot and feels like she is very stiff. Bathing and getting dressed both are uncomfortable for her with bending forward. Hysterectomy was done due to abnormal cells in the cervix and it was decided to remove the uterus to avoid any complications. Ovaries remain intact at this time. She is on a 10# weight lifting restriction at

## 2024-05-28 ENCOUNTER — HOSPITAL ENCOUNTER (OUTPATIENT)
Dept: PHYSICAL THERAPY | Age: 49
Setting detail: THERAPIES SERIES
Discharge: HOME | End: 2024-05-28
Payer: COMMERCIAL

## 2024-05-28 PROCEDURE — 97110 THERAPEUTIC EXERCISES: CPT

## 2024-05-28 PROCEDURE — 97140 MANUAL THERAPY 1/> REGIONS: CPT

## 2024-05-28 NOTE — PROGRESS NOTES
[facility]  PHYSICAL THERAPY  OUTPATIENT REHABILITATION - SPECIALIZED THERAPY SERVICES  [] PELVIC HEALTH EVALUATION  [x] DAILY NOTE  [] PROGRESS NOTE [] DISCHARGE NOTE  [] [facility]    Date: 2024  Patient Name:  Katerin Perry  : 1975  MRN: [id number]  CSN: [id number]    Referring Practitioner Joy Rey MD   Diagnosis Other specified disorders of muscle  Muscle weakness (generalized)  Low back pain, unspecified  Muscle spasm of back  Constipation  Lower abdominal pain, unspecified  Scar conditions and fibrosis of skin  Myalgia, unspecified site   Treatment Diagnosis M62.89 - Other Specified Disorders of Muscle  M62.81 - Muscle Weakness (Generalized)  M54.5 - Low Back Pain  M62.830 - Muscle Spasms of Back, K55.09 - Other Constipation, and R10.30 - Lower Abdominal Pain, Unspecified  L90.5 - Scar Conditions and Fibrosis of Skin  M79.10 - Myalgia, Unspecified Site   Date of Evaluation 24    Additional Pertinent History HTN, ectopic pregnancy , hysterectomy       Functional Outcome Measure Used Modified HUDSON   Functional Outcome Score 30/50 (24)    21/50 (24)      Insurance: Primary: Payor: MEDBEN /  /  / ,   Secondary: UMR   Authorization Information: No precert required   Visit # 21, 2/10 for progress note (Reporting Period: 24 to     )   Visits Allowed: Allowed 25 visits per calendar year.  0 visits have been used. FOR CPT CODE 04459 AQUATIC ONLY 10 VISITS ALLOWED. Soft Max.   Recertification Date: 24   Physician Follow-Up: Mid March   Physician Orders:    History of Present Illness: Pt is a 49 year old female s/p hysterectomy on 2/15/24. Pt reports that her muscles in her lower back feel very tight. She states that she can not get them to relax and it is very uncomfortable. States that she is sitting and laying a lot and feels like she is very stiff. Bathing and getting dressed both are uncomfortable for her with bending forward.

## 2024-05-30 ENCOUNTER — HOSPITAL ENCOUNTER (OUTPATIENT)
Dept: PHYSICAL THERAPY | Age: 49
Setting detail: THERAPIES SERIES
Discharge: HOME | End: 2024-05-30
Payer: COMMERCIAL

## 2024-05-30 PROCEDURE — 97110 THERAPEUTIC EXERCISES: CPT

## 2024-05-30 PROCEDURE — 97140 MANUAL THERAPY 1/> REGIONS: CPT

## 2024-05-30 PROCEDURE — 97530 THERAPEUTIC ACTIVITIES: CPT

## 2024-05-30 NOTE — PROGRESS NOTES
Hysterectomy was done due to abnormal cells in the cervix and it was decided to remove the uterus to avoid any complications. Ovaries remain intact at this time. She is on a 10# weight lifting restriction at this time. Had f/u on 2/21 and was told that everything is looking good. Katorolax and oxycodone for pain PRN. Colace PRN.      SUBJECTIVE: Pt reports that she did okay after last PT session and following a full day of work with minimal muscle spasms. States that the intensity and tightness of spasms does not seem to be as bad as it has over the past several weeks. Doesn't feel unable to catch her breath anymore when the spasms occur. States that she had a bad headache starting yesterday afternoon. States that she had a chiropractic adjustment and they adjusted thoracic spine and ribs. States that the headache is slightly improved today. Headache is at base of skull, wrapping around temples and into forehead just above the eyes.     Social/Functional History and Current Status:  Medications and Allergies have been reviewed and are listed on Medical History Questionnaire.    Katrein Perry lives with spouse in a single story home with stairs and no handrail to enter.    Task Previous Current   ADL’s  Independent Has returned to household chores; does seem to get increased muscle spasms and is pacing activities   IADL's Independent Ind   Ambulation Independent Independent   Transfers Independent Independent   Recreation Independent walking, yoga in the past Assistance Required   Community Integration Independent Independent   Driving Active  Active  but not at this time due to post-op restrictions   Work Full-Time.  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   Returned to work on 4/8     PAIN    Location Across low back; up center of back to just below the shoulder blades   Description Tightness; stiffness    Increased by Sudden

## 2024-06-03 ENCOUNTER — HOSPITAL ENCOUNTER (OUTPATIENT)
Dept: PHYSICAL THERAPY | Age: 49
Setting detail: THERAPIES SERIES
Discharge: HOME | End: 2024-06-03
Payer: COMMERCIAL

## 2024-06-03 PROCEDURE — 97140 MANUAL THERAPY 1/> REGIONS: CPT

## 2024-06-03 PROCEDURE — 97110 THERAPEUTIC EXERCISES: CPT

## 2024-06-03 NOTE — PROGRESS NOTES
manage the symptoms. Pt would benefit from continued PT 2 times per week with plan to gradually reduce to once a week and then once every other week to wean away from need for manual therapy to manage symptoms; however, we did try to reduce to once a week in the past and pt was not yet ready for this as spasms increased. I fear that this sudden stop in PT due to waiting on insurance authorization may flare muscle spasms and cause a regression which we have worked very hard on reducing between manual therapy during sessions and patient being extremely faithful with daily HEP.      Body Structures/Functions/Activity Limitations: Abdominal and core weakness, Decreased awareness of safe means of improving abdom strength and stability, Limited hip girdle flexibility, Impaired activity tolerance, Impaired ROM, Impaired strength, and Pain  Prognosis: Excellent    GOALS:  Patient Goal: to have less pain    Short Term Goals:  Time Frame: 4 weeks  Patient will report reduced back pain at rest from 1/10 to 0/10 in order to increase ability to sleep at night.  GOAL MET  Patient will increase standing tolerance to 15 minutes in order to complete household activities and ADLs. GOAL MET  Patient will report ability to sit upright without upper abdominal pain in order to complete a 4 hour work shift. GOAL MET  Patient will be independent with basic HEP for lumbar and gross hip girdle stretching. GOAL MET  Patient to demonstrate normal stool consistency 90% of the time with 90% improvement of sensation of complete defecation due to improved toileting and dietary habits along with improving PRM control. GOAL MET  This pt will demo a good understanding of proper toilet posture to decrease PFM tone and to improve visceral alignment for increased ease of defecation and reduced pelvic organ strain.    GOAL MET    Long Term Goals:  Time Frame: 12 weeks  Patient will report reduced pain levels at worst from 5/10 to 1/10 in order to return

## 2024-06-06 ENCOUNTER — HOSPITAL ENCOUNTER (OUTPATIENT)
Dept: PHYSICAL THERAPY | Age: 49
Setting detail: THERAPIES SERIES
End: 2024-06-06
Payer: COMMERCIAL

## 2024-06-12 ENCOUNTER — APPOINTMENT (OUTPATIENT)
Dept: PHYSICAL THERAPY | Age: 49
End: 2024-06-12
Payer: COMMERCIAL

## 2024-06-13 ENCOUNTER — APPOINTMENT (OUTPATIENT)
Dept: PHYSICAL THERAPY | Age: 49
End: 2024-06-13
Payer: COMMERCIAL

## 2024-10-01 ENCOUNTER — LAB (OUTPATIENT)
Dept: LAB | Age: 49
End: 2024-10-01

## 2024-10-03 ENCOUNTER — HOSPITAL ENCOUNTER (OUTPATIENT)
Dept: PHYSICAL THERAPY | Age: 49
Setting detail: THERAPIES SERIES
Discharge: HOME | End: 2024-10-03
Payer: COMMERCIAL

## 2024-10-03 PROCEDURE — 97140 MANUAL THERAPY 1/> REGIONS: CPT

## 2024-10-03 PROCEDURE — 97530 THERAPEUTIC ACTIVITIES: CPT

## 2024-10-03 PROCEDURE — 97110 THERAPEUTIC EXERCISES: CPT

## 2024-10-03 NOTE — PROGRESS NOTES
2 timer per week for 4 weeks  []  Modify plan of care as follows:  2 times per week for 4 weeks  []  Hold pending physician visit  []  Discharge    Time In 10:00   Time Out 11:00   Timed Code Minutes: 60 min   Total Treatment Time: 60 min       Electronically Signed by: Anny Hudson PT

## 2024-10-07 ENCOUNTER — HOSPITAL ENCOUNTER (OUTPATIENT)
Dept: PHYSICAL THERAPY | Age: 49
Setting detail: THERAPIES SERIES
Discharge: HOME | End: 2024-10-07
Payer: COMMERCIAL

## 2024-10-07 PROCEDURE — 97140 MANUAL THERAPY 1/> REGIONS: CPT

## 2024-10-07 PROCEDURE — 97110 THERAPEUTIC EXERCISES: CPT

## 2024-10-07 PROCEDURE — 97112 NEUROMUSCULAR REEDUCATION: CPT

## 2024-10-07 NOTE — PROGRESS NOTES
[facility]  PHYSICAL THERAPY  OUTPATIENT REHABILITATION - SPECIALIZED THERAPY SERVICES  [] PELVIC HEALTH EVALUATION  [x] DAILY NOTE  [] PROGRESS NOTE [] DISCHARGE NOTE  [x] [facility]    Date: 10/7/2024  Patient Name:  Katerin Perry  : 1975  MRN: [id number]  CSN: [id number]    Referring Practitioner Joy Rey MD   Diagnosis Other specified disorders of muscle  Muscle weakness (generalized)  Low back pain, unspecified  Muscle spasm of back  Constipation  Lower abdominal pain, unspecified  Scar conditions and fibrosis of skin  Myalgia, unspecified site   Treatment Diagnosis M62.89 - Other Specified Disorders of Muscle  M62.81 - Muscle Weakness (Generalized)  M54.5 - Low Back Pain  M62.830 - Muscle Spasms of Back, K55.09 - Other Constipation, and R10.30 - Lower Abdominal Pain, Unspecified  L90.5 - Scar Conditions and Fibrosis of Skin  M79.10 - Myalgia, Unspecified Site   Date of Evaluation 24    Additional Pertinent History HTN, ectopic pregnancy , hysterectomy       Functional Outcome Measure Used Modified HUDSON   Functional Outcome Score 30/50 (24)    21/50 (24)      Insurance: Primary: Payor: MEDBEN /  /  / ,   Secondary: UMR   Authorization Information: No precert required   Visit # 2/15 for progress note (Reporting Period: 24 to     )   Visits Allowed: THE PATIENT WON AN APPEAL THROUGH HER  FOR 15 PT VISITS (NO DATE RANGE).  CPT CODES  79143  [id number]  [id number]  [id number]   Recertification Date: 24   Physician Follow-Up: Mid March   Physician Orders:    History of Present Illness: Pt is a 49 year old female s/p hysterectomy on 2/15/24. Pt reports that her muscles in her lower back feel very tight. She states that she can not get them to relax and it is very uncomfortable. States that she is sitting and laying a lot and feels like she is very stiff. Bathing and getting dressed both are uncomfortable for her with bending forward.

## 2024-10-09 LAB — CYTOLOGY THIN PREP PAP: NORMAL

## 2024-10-14 ENCOUNTER — HOSPITAL ENCOUNTER (OUTPATIENT)
Dept: PHYSICAL THERAPY | Age: 49
Setting detail: THERAPIES SERIES
Discharge: HOME | End: 2024-10-14
Payer: COMMERCIAL

## 2024-10-14 PROCEDURE — 97140 MANUAL THERAPY 1/> REGIONS: CPT

## 2024-10-14 PROCEDURE — 97110 THERAPEUTIC EXERCISES: CPT

## 2024-10-14 NOTE — PROGRESS NOTES
[facility]  PHYSICAL THERAPY  OUTPATIENT REHABILITATION - SPECIALIZED THERAPY SERVICES  [] PELVIC HEALTH EVALUATION  [x] DAILY NOTE  [] PROGRESS NOTE [] DISCHARGE NOTE  [x] [facility]    Date: 10/14/2024  Patient Name:  Katerin Perry  : 1975  MRN: [id number]  CSN: [id number]    Referring Practitioner Joy Rey MD   Diagnosis Other specified disorders of muscle  Muscle weakness (generalized)  Low back pain, unspecified  Muscle spasm of back  Constipation  Lower abdominal pain, unspecified  Scar conditions and fibrosis of skin  Myalgia, unspecified site   Treatment Diagnosis M62.89 - Other Specified Disorders of Muscle  M62.81 - Muscle Weakness (Generalized)  M54.5 - Low Back Pain  M62.830 - Muscle Spasms of Back, K55.09 - Other Constipation, and R10.30 - Lower Abdominal Pain, Unspecified  L90.5 - Scar Conditions and Fibrosis of Skin  M79.10 - Myalgia, Unspecified Site   Date of Evaluation 24    Additional Pertinent History HTN, ectopic pregnancy , hysterectomy       Functional Outcome Measure Used Modified HUDSON   Functional Outcome Score 30/50 (24)    21/50 (24)      Insurance: Primary: Payor: MEDBEN /  /  / ,   Secondary: UMR   Authorization Information: No precert required   Visit # 3/15 for progress note (Reporting Period: 24 to     )   Visits Allowed: THE PATIENT WON AN APPEAL THROUGH HER  FOR 15 PT VISITS (NO DATE RANGE).  CPT CODES  54949  [id number]  [id number]  [id number]   Recertification Date: 24   Physician Follow-Up: Mid March   Physician Orders:    History of Present Illness: Pt is a 49 year old female s/p hysterectomy on 2/15/24. Pt reports that her muscles in her lower back feel very tight. She states that she can not get them to relax and it is very uncomfortable. States that she is sitting and laying a lot and feels like she is very stiff. Bathing and getting dressed both are uncomfortable for her with bending forward.

## 2024-10-21 ENCOUNTER — HOSPITAL ENCOUNTER (OUTPATIENT)
Dept: PHYSICAL THERAPY | Age: 49
Setting detail: THERAPIES SERIES
Discharge: HOME | End: 2024-10-21
Payer: COMMERCIAL

## 2024-10-21 PROCEDURE — 97140 MANUAL THERAPY 1/> REGIONS: CPT

## 2024-10-21 PROCEDURE — 97110 THERAPEUTIC EXERCISES: CPT

## 2024-10-28 ENCOUNTER — HOSPITAL ENCOUNTER (OUTPATIENT)
Dept: PHYSICAL THERAPY | Age: 49
Setting detail: THERAPIES SERIES
Discharge: HOME | End: 2024-10-28
Payer: COMMERCIAL

## 2024-10-28 PROCEDURE — 97110 THERAPEUTIC EXERCISES: CPT

## 2024-10-28 PROCEDURE — 97140 MANUAL THERAPY 1/> REGIONS: CPT

## 2024-10-28 NOTE — PROGRESS NOTES
[facility]  PHYSICAL THERAPY  OUTPATIENT REHABILITATION - SPECIALIZED THERAPY SERVICES  [] PELVIC HEALTH EVALUATION  [x] DAILY NOTE  [] PROGRESS NOTE [] DISCHARGE NOTE  [x] WAPAARMANDO GUERRERO    Date: 10/28/2024  Patient Name:  Katerin Perry  : 1975  MRN: [id number]  CSN: [id number]    Referring Practitioner Joy Rey MD   Diagnosis Other specified disorders of muscle  Muscle weakness (generalized)  Low back pain, unspecified  Muscle spasm of back  Constipation  Lower abdominal pain, unspecified  Scar conditions and fibrosis of skin  Myalgia, unspecified site   Treatment Diagnosis M62.89 - Other Specified Disorders of Muscle  M62.81 - Muscle Weakness (Generalized)  M54.5 - Low Back Pain  M62.830 - Muscle Spasms of Back, K55.09 - Other Constipation, and R10.30 - Lower Abdominal Pain, Unspecified  L90.5 - Scar Conditions and Fibrosis of Skin  M79.10 - Myalgia, Unspecified Site   Date of Evaluation 24    Additional Pertinent History HTN, ectopic pregnancy , hysterectomy       Functional Outcome Measure Used Modified HUDSON   Functional Outcome Score 30/50 (24)    21/50 (24)      Insurance: Primary: Payor: MEDBEN /  /  / ,   Secondary: UMR   Authorization Information: No precert required   Visit # 4/15 for progress note (Reporting Period: 24 to     )   Visits Allowed: THE PATIENT WON AN APPEAL THROUGH HER  FOR 15 PT VISITS (NO DATE RANGE).  CPT CODES  28166  [id number]  [id number]  [id number]   Recertification Date: 24   Physician Follow-Up: Mid March   Physician Orders:    History of Present Illness: Pt is a 49 year old female s/p hysterectomy on 2/15/24. Pt reports that her muscles in her lower back feel very tight. She states that she can not get them to relax and it is very uncomfortable. States that she is sitting and laying a lot and feels like she is very stiff. Bathing and getting dressed both are uncomfortable for her with bending forward.

## 2024-11-04 ENCOUNTER — HOSPITAL ENCOUNTER (OUTPATIENT)
Dept: PHYSICAL THERAPY | Age: 49
Setting detail: THERAPIES SERIES
Discharge: HOME | End: 2024-11-04
Payer: COMMERCIAL

## 2024-11-04 PROCEDURE — 97140 MANUAL THERAPY 1/> REGIONS: CPT

## 2024-11-04 NOTE — PROGRESS NOTES
at worst from 5/10 to 1/10 in order to return to work full time. GOAL NOT MET  Patient will demonstrate reduced fear avoidance behaviors and be able to perform transfers and all functional mobility tasks at a normalized pace without slow cautious movements.  GOAL MET  Patient will be independent with progressed HEP to include core strengthening. GOAL NOT MET  Patient will Increase abdominal strength of 4/5 or greater with habitual inferior to superior approach to allow for increased pelvic organ support. GOAL NOT MET  Patient's Modified HUDSON Score will improve to 10/50 or less to demonstrate functional improvement in condition. GOAL NOT MET      PLAN:  Treatment Recommendations: Strengthening, Range of Motion, Functional Mobility Training, Transfer Training, Endurance Training, Neuromuscular Re-education, Manual Therapy - Soft Tissue Mobilization, Pain Management, Home Exercise Program, Patient Education, Safety Education and Training, and Integrative Dry Needling    []  Plan of care initiated.  Plan to see patient 1 times per week for 12 weeks to address the treatment planned outlined above.  []  Continue with current plan of care - 2 timer per week for 4 weeks  [x]  Modify plan of care as follows:  1 times per week for 8 weeks  []  Hold pending physician visit  []  Discharge    Time In 7:00   Time Out 8:00   Timed Code Minutes: 60 min   Total Treatment Time: 60 min       Electronically Signed by: Anny Hudson PT

## 2024-11-11 ENCOUNTER — APPOINTMENT (OUTPATIENT)
Dept: PHYSICAL THERAPY | Age: 49
End: 2024-11-11
Payer: COMMERCIAL

## 2024-11-14 ENCOUNTER — HOSPITAL ENCOUNTER (OUTPATIENT)
Dept: PHYSICAL THERAPY | Age: 49
Setting detail: THERAPIES SERIES
Discharge: HOME | End: 2024-11-14
Payer: COMMERCIAL

## 2024-11-14 PROCEDURE — 97140 MANUAL THERAPY 1/> REGIONS: CPT

## 2024-11-14 PROCEDURE — 97530 THERAPEUTIC ACTIVITIES: CPT

## 2024-11-14 NOTE — PROGRESS NOTES
improving PRM control. GOAL MET  This pt will demo a good understanding of proper toilet posture to decrease PFM tone and to improve visceral alignment for increased ease of defecation and reduced pelvic organ strain.    GOAL MET    Long Term Goals:  Time Frame: 12 weeks  Patient will report reduced pain levels at worst from 5/10 to 1/10 in order to return to work full time. GOAL NOT MET  Patient will demonstrate reduced fear avoidance behaviors and be able to perform transfers and all functional mobility tasks at a normalized pace without slow cautious movements.  GOAL MET  Patient will be independent with progressed HEP to include core strengthening. GOAL NOT MET  Patient will Increase abdominal strength of 4/5 or greater with habitual inferior to superior approach to allow for increased pelvic organ support. GOAL NOT MET  Patient's Modified HUDSON Score will improve to 10/50 or less to demonstrate functional improvement in condition. GOAL NOT MET      PLAN:  Treatment Recommendations: Strengthening, Range of Motion, Functional Mobility Training, Transfer Training, Endurance Training, Neuromuscular Re-education, Manual Therapy - Soft Tissue Mobilization, Pain Management, Home Exercise Program, Patient Education, Safety Education and Training, and Integrative Dry Needling    []  Plan of care initiated.  Plan to see patient 1 times per week for 12 weeks to address the treatment planned outlined above.  []  Continue with current plan of care - 2 timer per week for 4 weeks  [x]  Modify plan of care as follows:  1 times per week for 8 weeks  []  Hold pending physician visit  []  Discharge    Time In 8:00   Time Out 9:00   Timed Code Minutes: 60 min   Total Treatment Time: 60 min       Electronically Signed by: Anny Hudson, PT

## 2024-11-18 ENCOUNTER — HOSPITAL ENCOUNTER (OUTPATIENT)
Dept: PHYSICAL THERAPY | Age: 49
Setting detail: THERAPIES SERIES
Discharge: HOME | End: 2024-11-18
Payer: COMMERCIAL

## 2024-11-18 PROCEDURE — 97140 MANUAL THERAPY 1/> REGIONS: CPT

## 2024-11-18 NOTE — PROGRESS NOTES
[facility]  PHYSICAL THERAPY  OUTPATIENT REHABILITATION - SPECIALIZED THERAPY SERVICES  [] PELVIC HEALTH EVALUATION  [x] DAILY NOTE  [] PROGRESS NOTE [] DISCHARGE NOTE  [x] ALAINA GUERRERO    Date: 2024  Patient Name:  Katerin Perry  : 1975  MRN: [id number]  CSN: [id number]    Referring Practitioner Joy Rey MD   Diagnosis Other specified disorders of muscle  Muscle weakness (generalized)  Low back pain, unspecified  Muscle spasm of back  Constipation  Lower abdominal pain, unspecified  Scar conditions and fibrosis of skin  Myalgia, unspecified site   Treatment Diagnosis M62.89 - Other Specified Disorders of Muscle  M62.81 - Muscle Weakness (Generalized)  M54.5 - Low Back Pain  M62.830 - Muscle Spasms of Back, K55.09 - Other Constipation, and R10.30 - Lower Abdominal Pain, Unspecified  L90.5 - Scar Conditions and Fibrosis of Skin  M79.10 - Myalgia, Unspecified Site   Date of Evaluation 24    Additional Pertinent History HTN, ectopic pregnancy , hysterectomy       Functional Outcome Measure Used Modified HUDSON   Functional Outcome Score 30/50 (24)    21/50 (24)      Insurance: Primary: Payor: MEDBEN /  /  / ,   Secondary: UMR   Authorization Information: No precert required   Visit # 8/15 for progress note (Reporting Period: 24 to     )   Visits Allowed: THE PATIENT WON AN APPEAL THROUGH HER  FOR 15 PT VISITS (NO DATE RANGE).  CPT CODES  [id number]  [id number]  [id number]  [id number]   Recertification Date: 24   Physician Follow-Up: Mid March   Physician Orders:    History of Present Illness: Pt is a 49 year old female s/p hysterectomy on 2/15/24. Pt reports that her muscles in her lower back feel very tight. She states that she can not get them to relax and it is very uncomfortable. States that she is sitting and laying a lot and feels like she is very stiff. Bathing and getting dressed both are uncomfortable for her with bending forward.

## 2024-11-25 ENCOUNTER — HOSPITAL ENCOUNTER (OUTPATIENT)
Dept: PHYSICAL THERAPY | Age: 49
Setting detail: THERAPIES SERIES
Discharge: HOME | End: 2024-11-25
Payer: COMMERCIAL

## 2024-11-25 PROCEDURE — 97140 MANUAL THERAPY 1/> REGIONS: CPT

## 2024-11-25 PROCEDURE — 97530 THERAPEUTIC ACTIVITIES: CPT

## 2024-11-25 NOTE — PROGRESS NOTES
include core strengthening. GOAL NOT MET  Patient will Increase abdominal strength of 4/5 or greater with habitual inferior to superior approach to allow for increased pelvic organ support. GOAL NOT MET  Patient's Modified HUDSON Score will improve to 10/50 or less to demonstrate functional improvement in condition. GOAL NOT MET      PLAN:  Treatment Recommendations: Strengthening, Range of Motion, Functional Mobility Training, Transfer Training, Endurance Training, Neuromuscular Re-education, Manual Therapy - Soft Tissue Mobilization, Pain Management, Home Exercise Program, Patient Education, Safety Education and Training, and Integrative Dry Needling    []  Plan of care initiated.  Plan to see patient 1 times per week for 12 weeks to address the treatment planned outlined above.  []  Continue with current plan of care - 2 timer per week for 4 weeks  [x]  Modify plan of care as follows:  1 times per week for 8 weeks  []  Hold pending physician visit  []  Discharge    Time In 7:00   Time Out 8:00   Timed Code Minutes: 60 min   Total Treatment Time: 60 min       Electronically Signed by: Anny Hudson PT

## 2024-11-26 ENCOUNTER — APPOINTMENT (OUTPATIENT)
Dept: PHYSICAL THERAPY | Age: 49
End: 2024-11-26
Payer: COMMERCIAL

## 2024-12-02 ENCOUNTER — HOSPITAL ENCOUNTER (OUTPATIENT)
Dept: PHYSICAL THERAPY | Age: 49
Setting detail: THERAPIES SERIES
Discharge: HOME | End: 2024-12-02
Payer: COMMERCIAL

## 2024-12-02 PROCEDURE — 97110 THERAPEUTIC EXERCISES: CPT

## 2024-12-02 PROCEDURE — 97140 MANUAL THERAPY 1/> REGIONS: CPT

## 2024-12-02 NOTE — PROGRESS NOTES
least 10,000 steps per day and has not noticed any increase or decrease in pain levels with this   Community Integration Independent Independent   Driving Active  Active    Work Full-Time.  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   Returned to work on 4/8     PAIN    Location Rib cage; upper- middle back; occasionally low back   Description Tightness; stiffness    Increased by Carrying/holding grand daughter; typing for work; pulling weeds   Decreased by Leaning over the couch and bending the knees, leaning forward and putting arms up   Maximum Intensity 8/10 - occurs about once every 2 weeks   Best Intensity 1-2/10 - if she has been able to rest and has had a calm week with not a lot of extra activities   Todays Rating 6/10 - ribs and upper back    Other/Function Has gained weight as she has not been as active as activity has been flaring pain levels     SEXUAL /MENSTRUAL HISTORY [] Deferred secondary to:    Age of First Menstrual Period 14   Are Cycles Regular yes    Pain During Menses no   Number of Pregnancies 1 - a few miscarries    Number of Vaginal Deliveries 1   Number of Caesarean Deliveries 0     BLADDER ASSESSMENT [] Deferred secondary to:   Daily Fluid Ingestion: 2 bottles water recently (used to be 8-10 before surgery), occasional tea, occasional tea, occasional sprite     Urination Frequency Times/Day: every few hours  Times/Night: 0   Volume Large and Medium   Urge Sensation Normal    SYMPTOM QUESTIONNAIRE   Loses Urine Upon: NA   Incontinence Volume: NA   Frequency of Leakage: NA   Wets the Bed: no   Burning/Pain with Urination: no   Difficulty Starting a Stream of Urine: no   Incomplete Emptying No   Strain to Empty Bladder: no   “Falling Out” Feeling: no   Urinate more than 7 times/day: no   Use a form of Leakage Protection: no   Restrict Fluid Intake: no   Stream Strength Average       BOWEL ASSESSMENT [] Deferred

## 2024-12-04 ENCOUNTER — HOSPITAL ENCOUNTER (OUTPATIENT)
Dept: PHYSICAL THERAPY | Age: 49
Setting detail: THERAPIES SERIES
Discharge: HOME | End: 2024-12-04
Payer: COMMERCIAL

## 2024-12-04 PROCEDURE — 97140 MANUAL THERAPY 1/> REGIONS: CPT

## 2024-12-04 PROCEDURE — 97110 THERAPEUTIC EXERCISES: CPT

## 2024-12-04 NOTE — PROGRESS NOTES
mobility noted following graston and DN treatment   Cupping lumbar/thoracic alice 15 min  With lalo pose stretch and lateral lalo pose   Graston  15 min x Intercostals, alice, infraspinatus, supraspinatus   Abdominal STM  5 min  R iliacus    Kinesiology tape 3 min  Thoracic paraspinals   Dry Needling 5 min x Lumbar, thoracic, cervical alice   Diaphragm release in sidelying B 3 min           Exercise/Intervention   Notes   Nature of condition; PT POC 2 min  x    Dietary fiber, toilet positioning, hydration, stool softener, activity levels 2 min      Test kinesiology tape at L forearm 2 min   Assess skin next session   Kinesiology tape to thoracic para's from about T4-T8 5 min             Diaphragmatic breathing for pain control 5 min  x    Seated SKTC 3x30 sec  x    Prone lying 3 min   To follow seated SKTC   Hamstring stretch 3x30 sec  x    Piri stretch push and pull 3x30 sec  x    Supine adductor stretch 3x30 sec  x    Swiss ball roll outs forward/lateral or table slides 3x30 sec      Wolfgang stretch 3x30 sec  x    Sidelying QL stretch 3x30 sec  x    Lalo pose and lateral lalo pose 3x30 sec  x           Tummy tuck 10  x           PPT 10 5 sec     PPT alt arms 10  x    PPT leg march 10  x    PPT opposites 10  x    PPT heel walk 10  x           Supine serratus punches 10  x May add soup cans for increases resistance   Supine on towel roll pec stretch 3x30 sec  x    Supine or seated horizontal abduction 10 [place]  x                  Seated scapular retractions 10 3 sec x           FERNANDO standing unresisted wall reach 4 breaths 4 x    Chest/dive stretch 3x30 sec  x    LTR with LE's elevated       Posterior shoulder stretch 3x30 sec  x             Specific Interventions Next Treatment: lumbar STM, abdominal STM once bruising is improved, abdominal massage for constipation once incisions are healed; gross hip girdle stretching, core strengthening once pain is managed     Activity/Treatment Tolerance:  [x]  Patient

## 2024-12-09 ENCOUNTER — HOSPITAL ENCOUNTER (OUTPATIENT)
Dept: PHYSICAL THERAPY | Age: 49
Setting detail: THERAPIES SERIES
Discharge: HOME | End: 2024-12-09
Payer: COMMERCIAL

## 2024-12-09 PROCEDURE — 97110 THERAPEUTIC EXERCISES: CPT

## 2024-12-09 PROCEDURE — 97140 MANUAL THERAPY 1/> REGIONS: CPT

## 2024-12-09 NOTE — PROGRESS NOTES
[facility]  PHYSICAL THERAPY  OUTPATIENT REHABILITATION - SPECIALIZED THERAPY SERVICES  [] PELVIC HEALTH EVALUATION  [x] DAILY NOTE  [] PROGRESS NOTE [] DISCHARGE NOTE  [x] WAP[facility]ARMANDO YOLANDA    Date: 2024  Patient Name:  Katerin Perry  : 1975  MRN: [id number]  CSN: [id number]    Referring Practitioner Joy Rey MD   Diagnosis Other specified disorders of muscle  Muscle weakness (generalized)  Low back pain, unspecified  Muscle spasm of back  Constipation  Lower abdominal pain, unspecified  Scar conditions and fibrosis of skin  Myalgia, unspecified site   Treatment Diagnosis M62.89 - Other Specified Disorders of Muscle  M62.81 - Muscle Weakness (Generalized)  M54.5 - Low Back Pain  M62.830 - Muscle Spasms of Back, K55.09 - Other Constipation, and R10.30 - Lower Abdominal Pain, Unspecified  L90.5 - Scar Conditions and Fibrosis of Skin  M79.10 - Myalgia, Unspecified Site   Date of Evaluation 24    Additional Pertinent History HTN, ectopic pregnancy , hysterectomy       Functional Outcome Measure Used Modified HUDSON   Functional Outcome Score 30/50 (24)    21/50 (24)      Insurance: Primary: Payor: MEDBEN /  /  / ,   Secondary: UMR   Authorization Information: No precert required   Visit # 12/15 for progress note (Reporting Period: 24 to     )   Visits Allowed: THE PATIENT WON AN APPEAL THROUGH HER  FOR 15 PT VISITS (NO DATE RANGE).  CPT CODES  72663  [id number]  [id number]  [id number]   Recertification Date: 24   Physician Follow-Up: Mid March   Physician Orders:    History of Present Illness: Pt is a 49 year old female s/p hysterectomy on 2/15/24. Pt reports that her muscles in her lower back feel very tight. She states that she can not get them to relax and it is very uncomfortable. States that she is sitting and laying a lot and feels like she is very stiff. Bathing and getting dressed both are uncomfortable for her with bending forward.

## 2024-12-11 ENCOUNTER — HOSPITAL ENCOUNTER (OUTPATIENT)
Dept: PHYSICAL THERAPY | Age: 49
Setting detail: THERAPIES SERIES
Discharge: HOME | End: 2024-12-11
Payer: COMMERCIAL

## 2024-12-11 PROCEDURE — 97110 THERAPEUTIC EXERCISES: CPT

## 2024-12-11 PROCEDURE — 97140 MANUAL THERAPY 1/> REGIONS: CPT

## 2024-12-11 NOTE — PROGRESS NOTES
Tolerance:  [x]  Patient tolerated treatment well  []  Patient limited by fatigue  []  Patient limited by pain   []  Patient limited by medical complications  []  Other:     Patient Education:   [x]  HEP/Education Completed: Reviewed current program. Pt has been lying supine on towel roll, now with use of heating pad to increase comfort of this.   LocalOn Access Code:  []  No new Education completed  [x]  Reviewed Prior HEP      [x]  Patient verbalized and/or demonstrated understanding of education provided.  []  Patient unable to verbalize and/or demonstrate understanding of education provided.  Will continue education.  []  Barriers to learning: none    Assessment: Pt with reduced complaints of mid/upper back and rib pain today; however, feels like she has increased pain/tightness in the low back today. Palpable tightness noted at B lumbar alice bonifacio at L1-2 region with R>L. Performed dry needling to lumbar, thoracic, and cervical alice today due to increased lumbar tightness after not performing dry needling last session. Also performed graston to paraspinals, rhomboids, shorty-scapular region with tenderness reported at medial and lateral border of R scapulae. Followed dry needling and graston treatment with cupping and DTM with pt reporting relief following cupping treatment today. Applied ActiveICE at end of session today.       Body Structures/Functions/Activity Limitations: Abdominal and core weakness, Decreased awareness of safe means of improving abdom strength and stability, Limited hip girdle flexibility, Impaired activity tolerance, Impaired ROM, Impaired strength, and Pain  Prognosis: Excellent    GOALS:  Patient Goal: to have less pain    Short Term Goals:  Time Frame: 4 weeks  Patient will report reduced back pain at rest from 1/10 to 0/10 in order to increase ability to sleep at night.  GOAL MET  Patient will increase standing tolerance to 15 minutes in order to complete household activities and

## 2024-12-16 ENCOUNTER — HOSPITAL ENCOUNTER (OUTPATIENT)
Dept: PHYSICAL THERAPY | Age: 49
Setting detail: THERAPIES SERIES
Discharge: HOME | End: 2024-12-16
Payer: COMMERCIAL

## 2024-12-16 PROCEDURE — 97140 MANUAL THERAPY 1/> REGIONS: CPT

## 2024-12-16 PROCEDURE — 97110 THERAPEUTIC EXERCISES: CPT

## 2024-12-16 NOTE — PROGRESS NOTES
Hysterectomy was done due to abnormal cells in the cervix and it was decided to remove the uterus to avoid any complications. Ovaries remain intact at this time. She is on a 10# weight lifting restriction at this time. Had f/u on 2/21 and was told that everything is looking good. Katorolax and oxycodone for pain PRN. Colace PRN.      SUBJECTIVE: Pt reports that she feels tight again this morning and rates pain between a 4-5/10 over the past couple of days. Has not had any further spasms since last PT session. Reports less soreness throughout the low back; however, was very tender to palpation at B lumbar alice today. She does have to go into work several days this week.          Social/Functional History and Current Status:  Medications and Allergies have been reviewed and are listed on Medical History Questionnaire.    Katerin Perry lives with spouse in a single story home with stairs and no handrail to enter.    Task Previous Current   ADL’s  Independent Has returned to household activities; if she does more than normal on any given day, then muscle tightness and spasms flare again - bonifacio reaching tasks or activities with arms out in front of her   IADL's Independent Ind   Ambulation Independent Independent   Transfers Independent Independent   Recreation Independent walking, yoga in the past Assistance Required has been walking at least 10,000 steps per day and has not noticed any increase or decrease in pain levels with this   Community Integration Independent Independent   Driving Active  Active    Work Full-Time.  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   Returned to work on 4/8     PAIN    Location Rib cage; upper- middle back; occasionally low back   Description Tightness; stiffness    Increased by Carrying/holding grand daughter; typing for work; pulling weeds   Decreased by Leaning over the couch and bending the knees,

## 2024-12-17 ENCOUNTER — APPOINTMENT (OUTPATIENT)
Dept: PHYSICAL THERAPY | Age: 49
End: 2024-12-17
Payer: COMMERCIAL

## 2024-12-18 ENCOUNTER — HOSPITAL ENCOUNTER (OUTPATIENT)
Dept: PHYSICAL THERAPY | Age: 49
Setting detail: THERAPIES SERIES
Discharge: HOME | End: 2024-12-18
Payer: COMMERCIAL

## 2024-12-18 PROCEDURE — 97530 THERAPEUTIC ACTIVITIES: CPT

## 2024-12-18 PROCEDURE — 97140 MANUAL THERAPY 1/> REGIONS: CPT

## 2024-12-18 NOTE — PROGRESS NOTES
complications  []  Other:     Patient Education:   [x]  HEP/Education Completed: Reviewed current program. Pt has been lying supine on towel roll, now with use of heating pad to increase comfort of this. On days when she has been very active, this stretch does feel like it is necessary and is helpful with thoracic muscle spasms.   [employer]urus Access Code:  []  No new Education completed  [x]  Reviewed Prior HEP      [x]  Patient verbalized and/or demonstrated understanding of education provided.  []  Patient unable to verbalize and/or demonstrate understanding of education provided.  Will continue education.  []  Barriers to learning: none    Assessment: Pt with increased tenderness/tightness throughout thoracic spine and between shoulder blades today. Did spend a lot of time standing and typing at work yesterday and increased tightness may be due to this. Pt did not try to lie supine on towel roll last night after work due to fatigue; encouraged trying this after work today to see if this minimizes pain and tightness symptoms. Did not progress program today due to increased pain levels today and pt is not scheduled for another PT session for about 4 weeks at this time. Pt is on PT wait list to get in to a sooner appointment if one does become available. Pt does have a massage scheduled for the first week of January to help combat any tightness that may occur between PT sessions. Performed dry needling to lumbar and thoracic paraspinals today. Followed by STM and Graston to paraspinals, rhomboids, shorty-scapular region with increased tenderness at B scapulae today. Also increased tenderness at superior intercostals today as well. Performed cupping to paraspinals as well with pt reporting relief of symptoms following cupping treatment today. Plan to progress HEP to more advanced core strengthening and add standing core strengthening tasks such as standing trunk rotations with theraband resistance.    Body

## 2025-01-06 ENCOUNTER — APPOINTMENT (OUTPATIENT)
Dept: PHYSICAL THERAPY | Age: 50
End: 2025-01-06
Payer: COMMERCIAL

## 2025-01-13 ENCOUNTER — HOSPITAL ENCOUNTER (OUTPATIENT)
Dept: PHYSICAL THERAPY | Age: 50
Setting detail: THERAPIES SERIES
Discharge: HOME | End: 2025-01-13
Payer: COMMERCIAL

## 2025-01-13 PROCEDURE — 97530 THERAPEUTIC ACTIVITIES: CPT

## 2025-01-13 PROCEDURE — 97140 MANUAL THERAPY 1/> REGIONS: CPT

## 2025-01-13 PROCEDURE — 97110 THERAPEUTIC EXERCISES: CPT

## 2025-01-13 NOTE — PROGRESS NOTES
[facility]  PHYSICAL THERAPY  OUTPATIENT REHABILITATION - SPECIALIZED THERAPY SERVICES  [] PELVIC HEALTH EVALUATION  [x] DAILY NOTE  [] PROGRESS NOTE [] DISCHARGE NOTE  [] ALAINA GUERRERO    Date: 2025  Patient Name:  Katerin Perry  : 1975  MRN: [id number]  CSN: [id number]    Referring Practitioner Joy Rey MD   Diagnosis Other specified disorders of muscle  Muscle weakness (generalized)  Low back pain, unspecified  Muscle spasm of back  Constipation  Lower abdominal pain, unspecified  Scar conditions and fibrosis of skin  Myalgia, unspecified site   Treatment Diagnosis M62.89 - Other Specified Disorders of Muscle  M62.81 - Muscle Weakness (Generalized)  M54.5 - Low Back Pain  M62.830 - Muscle Spasms of Back, K55.09 - Other Constipation, and R10.30 - Lower Abdominal Pain, Unspecified  L90.5 - Scar Conditions and Fibrosis of Skin  M79.10 - Myalgia, Unspecified Site   Date of Evaluation 24    Additional Pertinent History HTN, ectopic pregnancy , hysterectomy       Functional Outcome Measure Used Modified HUDSON   Functional Outcome Score 30/50 (24)    21/50 (24)      Insurance: Primary: Payor: MEDBEN /  /  / ,   Secondary: UMR   Authorization Information: No precert required   Visit # 1/15 for progress note (Reporting Period: 24 to     )   Visits Allowed: THE PATIENT WON AN APPEAL THROUGH HER  FOR 15 PT VISITS (NO DATE RANGE).  CPT CODES  80242  [id number]  [id number]  [id number]   Recertification Date: 24   Physician Follow-Up: Mid March   Physician Orders:    History of Present Illness: Pt is a 49 year old female s/p hysterectomy on 2/15/24. Pt reports that her muscles in her lower back feel very tight. She states that she can not get them to relax and it is very uncomfortable. States that she is sitting and laying a lot and feels like she is very stiff. Bathing and getting dressed both are uncomfortable for her with bending forward.

## 2025-01-22 ENCOUNTER — APPOINTMENT (OUTPATIENT)
Dept: PHYSICAL THERAPY | Age: 50
End: 2025-01-22
Payer: COMMERCIAL

## 2025-01-27 ENCOUNTER — HOSPITAL ENCOUNTER (OUTPATIENT)
Dept: PHYSICAL THERAPY | Age: 50
Setting detail: THERAPIES SERIES
Discharge: HOME | End: 2025-01-27
Payer: COMMERCIAL

## 2025-01-27 PROCEDURE — 97110 THERAPEUTIC EXERCISES: CPT

## 2025-01-27 PROCEDURE — 97140 MANUAL THERAPY 1/> REGIONS: CPT

## 2025-01-27 NOTE — PROGRESS NOTES
[facility]  PHYSICAL THERAPY  OUTPATIENT REHABILITATION - SPECIALIZED THERAPY SERVICES  [] PELVIC HEALTH EVALUATION  [x] DAILY NOTE  [] PROGRESS NOTE [] DISCHARGE NOTE  [] [facility]    Date: 2025  Patient Name:  Katerin Perry  : 1975  MRN: [id number]  CSN: [id number]    Referring Practitioner Joy Rey MD   Diagnosis Low back pain, unspecified   Treatment Diagnosis M62.89 - Other Specified Disorders of Muscle  M62.81 - Muscle Weakness (Generalized)  M54.5 - Low Back Pain  M62.830 - Muscle Spasms of Back, K55.09 - Other Constipation, and R10.30 - Lower Abdominal Pain, Unspecified  L90.5 - Scar Conditions and Fibrosis of Skin  M79.10 - Myalgia, Unspecified Site   Date of Evaluation 24    Additional Pertinent History HTN, ectopic pregnancy , hysterectomy       Functional Outcome Measure Used Modified HUDSON   Functional Outcome Score 30/50 (24)    21/50 (24)      Insurance: Primary: Payor: amaysim /  /  / ,   Secondary:    Authorization Information: No precert required   Visit # 1/15 for progress note (Reporting Period: 24 to     )   Visits Allowed: THE PATIENT WON AN APPEAL THROUGH HER  FOR 15 PT VISITS (NO DATE RANGE).  CPT CODES  [id number]  [id number]  [id number]  [id number]   Recertification Date: 24   Physician Follow-Up: Mid March   Physician Orders:    History of Present Illness: Pt is a 49 year old female s/p hysterectomy on 2/15/24. Pt reports that her muscles in her lower back feel very tight. She states that she can not get them to relax and it is very uncomfortable. States that she is sitting and laying a lot and feels like she is very stiff. Bathing and getting dressed both are uncomfortable for her with bending forward. Hysterectomy was done due to abnormal cells in the cervix and it was decided to remove the uterus to avoid any complications. Ovaries remain intact at this time. She is on a 10# weight lifting restriction at this

## 2025-02-04 ENCOUNTER — APPOINTMENT (OUTPATIENT)
Dept: PHYSICAL THERAPY | Age: 50
End: 2025-02-04
Payer: COMMERCIAL

## 2025-02-10 ENCOUNTER — HOSPITAL ENCOUNTER (OUTPATIENT)
Dept: PHYSICAL THERAPY | Age: 50
Setting detail: THERAPIES SERIES
Discharge: HOME | End: 2025-02-10
Payer: COMMERCIAL

## 2025-02-10 PROCEDURE — 97110 THERAPEUTIC EXERCISES: CPT

## 2025-02-10 PROCEDURE — 97140 MANUAL THERAPY 1/> REGIONS: CPT

## 2025-02-10 NOTE — PROGRESS NOTES
[facility]  PHYSICAL THERAPY  OUTPATIENT REHABILITATION - SPECIALIZED THERAPY SERVICES  [] PELVIC HEALTH EVALUATION  [x] DAILY NOTE  [] PROGRESS NOTE [] DISCHARGE NOTE  [] [facility]    Date: 2/10/2025  Patient Name:  Katerin Perry  : 1975  MRN: [id number]  CSN: [id number]    Referring Practitioner Joy Rey MD   Diagnosis Low back pain, unspecified   Treatment Diagnosis M62.89 - Other Specified Disorders of Muscle  M62.81 - Muscle Weakness (Generalized)  M54.5 - Low Back Pain  M62.830 - Muscle Spasms of Back, K55.09 - Other Constipation, and R10.30 - Lower Abdominal Pain, Unspecified  L90.5 - Scar Conditions and Fibrosis of Skin  M79.10 - Myalgia, Unspecified Site   Date of Evaluation 24    Additional Pertinent History HTN, ectopic pregnancy , hysterectomy       Functional Outcome Measure Used Modified HUDSON   Functional Outcome Score 30/50 (24)    21/50 (24)      Insurance: Primary: Payor: [employer] /  /  / ,   Secondary:    Authorization Information: No precert required   Visit # 3/15 for progress note (Reporting Period: 24 to     )   Visits Allowed: THE PATIENT WON AN APPEAL THROUGH HER  FOR 15 PT VISITS (NO DATE RANGE).  CPT CODES  71338  34663  [id number]  [id number]   Recertification Date: 24    Physician Follow-Up: Mid March   Physician Orders:    History of Present Illness: Pt is a 49 year old female s/p hysterectomy on 2/15/24. Pt reports that her muscles in her lower back feel very tight. She states that she can not get them to relax and it is very uncomfortable. States that she is sitting and laying a lot and feels like she is very stiff. Bathing and getting dressed both are uncomfortable for her with bending forward. Hysterectomy was done due to abnormal cells in the cervix and it was decided to remove the uterus to avoid any complications. Ovaries remain intact at this time. She is on a 10# weight lifting restriction at this

## 2025-02-17 ENCOUNTER — APPOINTMENT (OUTPATIENT)
Dept: PHYSICAL THERAPY | Age: 50
End: 2025-02-17
Payer: COMMERCIAL

## 2025-02-24 ENCOUNTER — HOSPITAL ENCOUNTER (OUTPATIENT)
Dept: PHYSICAL THERAPY | Age: 50
Setting detail: THERAPIES SERIES
End: 2025-02-24
Payer: COMMERCIAL

## 2025-03-03 ENCOUNTER — APPOINTMENT (OUTPATIENT)
Dept: PHYSICAL THERAPY | Age: 50
End: 2025-03-03
Payer: COMMERCIAL

## 2025-03-10 ENCOUNTER — HOSPITAL ENCOUNTER (OUTPATIENT)
Dept: PHYSICAL THERAPY | Age: 50
Setting detail: THERAPIES SERIES
Discharge: HOME | End: 2025-03-10
Payer: COMMERCIAL

## 2025-03-10 ENCOUNTER — APPOINTMENT (OUTPATIENT)
Dept: PHYSICAL THERAPY | Age: 50
End: 2025-03-10
Payer: COMMERCIAL

## 2025-03-10 PROCEDURE — 97530 THERAPEUTIC ACTIVITIES: CPT

## 2025-03-10 PROCEDURE — 97140 MANUAL THERAPY 1/> REGIONS: CPT

## 2025-03-10 NOTE — PROGRESS NOTES
[facility]  PHYSICAL THERAPY  OUTPATIENT REHABILITATION - SPECIALIZED THERAPY SERVICES  [] PELVIC HEALTH EVALUATION  [x] DAILY NOTE  [] PROGRESS NOTE [] DISCHARGE NOTE  [] [facility]    Date: 3/10/2025  Patient Name:  Katerin Perry  : 1975  MRN: [id number]  CSN: [id number]    Referring Practitioner Joy Rey MD   Diagnosis Low back pain, unspecified   Treatment Diagnosis M62.89 - Other Specified Disorders of Muscle  M62.81 - Muscle Weakness (Generalized)  M54.5 - Low Back Pain  M62.830 - Muscle Spasms of Back, K55.09 - Other Constipation, and R10.30 - Lower Abdominal Pain, Unspecified  L90.5 - Scar Conditions and Fibrosis of Skin  M79.10 - Myalgia, Unspecified Site   Date of Evaluation 24    Additional Pertinent History HTN, ectopic pregnancy , hysterectomy       Functional Outcome Measure Used Modified HUDSON   Functional Outcome Score 30/50 (24)    21/50 (24)      Insurance: Primary: Payor: [employer] /  /  / ,   Secondary:    Authorization Information: No precert required   Visit # 4/15 for progress note (Reporting Period: 24 to     )   Visits Allowed: THE PATIENT WON AN APPEAL THROUGH HER  FOR 15 PT VISITS (NO DATE RANGE).  CPT CODES  79260  98901  75029  37401   Recertification Date: 24    Physician Follow-Up: Mid March   Physician Orders:    History of Present Illness: Pt is a 49 year old female s/p hysterectomy on 2/15/24. Pt reports that her muscles in her lower back feel very tight. She states that she can not get them to relax and it is very uncomfortable. States that she is sitting and laying a lot and feels like she is very stiff. Bathing and getting dressed both are uncomfortable for her with bending forward. Hysterectomy was done due to abnormal cells in the cervix and it was decided to remove the uterus to avoid any complications. Ovaries remain intact at this time. She is on a 10# weight lifting restriction at this

## 2025-04-21 ENCOUNTER — APPOINTMENT (OUTPATIENT)
Dept: PHYSICAL THERAPY | Age: 50
End: 2025-04-21
Payer: COMMERCIAL

## 2025-04-21 ENCOUNTER — HOSPITAL ENCOUNTER (OUTPATIENT)
Dept: PHYSICAL THERAPY | Age: 50
Setting detail: THERAPIES SERIES
Discharge: HOME | End: 2025-04-21
Payer: COMMERCIAL

## 2025-04-21 PROCEDURE — 97140 MANUAL THERAPY 1/> REGIONS: CPT

## 2025-04-21 PROCEDURE — 97530 THERAPEUTIC ACTIVITIES: CPT

## 2025-04-21 NOTE — PROGRESS NOTES
and manual therapy at next session.       Body Structures/Functions/Activity Limitations: Abdominal and core weakness, Decreased awareness of safe means of improving abdom strength and stability, Limited hip girdle flexibility, Impaired activity tolerance, Impaired ROM, Impaired strength, and Pain  Prognosis: Excellent    GOALS:  Patient Goal: to have less pain    Short Term Goals:  Time Frame: 4 weeks  Patient will report reduced back pain at rest from 1/10 to 0/10 in order to increase ability to sleep at night.  GOAL MET  Patient will increase standing tolerance to 15 minutes in order to complete household activities and ADLs. GOAL MET  Patient will report ability to sit upright without upper abdominal pain in order to complete a 4 hour work shift. GOAL MET  Patient will be independent with basic HEP for lumbar and gross hip girdle stretching. GOAL MET  Patient to demonstrate normal stool consistency 90% of the time with 90% improvement of sensation of complete defecation due to improved toileting and dietary habits along with improving PRM control. GOAL MET  This pt will demo a good understanding of proper toilet posture to decrease PFM tone and to improve visceral alignment for increased ease of defecation and reduced pelvic organ strain.    GOAL MET    Long Term Goals:  Time Frame: 12 weeks  Patient will report reduced pain levels at worst from 5/10 to 1/10 in order to return to work full time. GOAL NOT MET  Patient will demonstrate reduced fear avoidance behaviors and be able to perform transfers and all functional mobility tasks at a normalized pace without slow cautious movements.  GOAL MET  Patient will be independent with progressed HEP to include core strengthening. GOAL NOT MET  Patient will Increase abdominal strength of 4/5 or greater with habitual inferior to superior approach to allow for increased pelvic organ support. GOAL NOT MET  Patient's Modified HUDSON Score will improve to 10/50 or less to

## 2025-05-05 ENCOUNTER — HOSPITAL ENCOUNTER (OUTPATIENT)
Dept: PHYSICAL THERAPY | Age: 50
Setting detail: THERAPIES SERIES
Discharge: HOME | End: 2025-05-05
Payer: COMMERCIAL

## 2025-05-05 PROCEDURE — 97530 THERAPEUTIC ACTIVITIES: CPT

## 2025-05-05 PROCEDURE — 97140 MANUAL THERAPY 1/> REGIONS: CPT

## 2025-05-05 NOTE — PROGRESS NOTES
[facility]  PHYSICAL THERAPY  OUTPATIENT REHABILITATION - SPECIALIZED THERAPY SERVICES  [] PELVIC HEALTH EVALUATION  [x] DAILY NOTE  [] PROGRESS NOTE [] DISCHARGE NOTE  [] [facility]    Date: 2025  Patient Name:  Katerin Perry  : 1975  MRN: [id number]  CSN: [id number]    Referring Practitioner Joy Rey MD   Diagnosis Low back pain, unspecified   Treatment Diagnosis M62.89 - Other Specified Disorders of Muscle  M62.81 - Muscle Weakness (Generalized)  M54.5 - Low Back Pain  M62.830 - Muscle Spasms of Back, K55.09 - Other Constipation, and R10.30 - Lower Abdominal Pain, Unspecified  L90.5 - Scar Conditions and Fibrosis of Skin  M79.10 - Myalgia, Unspecified Site   Date of Evaluation 24    Additional Pertinent History HTN, ectopic pregnancy , hysterectomy       Functional Outcome Measure Used Modified HUDSON   Functional Outcome Score 30/50 (24)    21/50 (24)      Insurance: Primary: Payor: [employer] /  /  / ,   Secondary:    Authorization Information: No precert required   Visit # 6/15 for progress note (Reporting Period: 24 to     )   Visits Allowed: THE PATIENT WON AN APPEAL THROUGH HER  FOR 15 PT VISITS (NO DATE RANGE).  CPT CODES  43740  60979  99374  66935   Recertification Date: 24    Physician Follow-Up: Mid March   Physician Orders:    History of Present Illness: Pt is a 49 year old female s/p hysterectomy on 2/15/24. Pt reports that her muscles in her lower back feel very tight. She states that she can not get them to relax and it is very uncomfortable. States that she is sitting and laying a lot and feels like she is very stiff. Bathing and getting dressed both are uncomfortable for her with bending forward. Hysterectomy was done due to abnormal cells in the cervix and it was decided to remove the uterus to avoid any complications. Ovaries remain intact at this time. She is on a 10# weight lifting restriction at this

## 2025-05-12 ENCOUNTER — APPOINTMENT (OUTPATIENT)
Dept: PHYSICAL THERAPY | Age: 50
End: 2025-05-12
Payer: COMMERCIAL

## 2025-05-19 ENCOUNTER — HOSPITAL ENCOUNTER (OUTPATIENT)
Dept: PHYSICAL THERAPY | Age: 50
Setting detail: THERAPIES SERIES
Discharge: HOME | End: 2025-05-19
Payer: COMMERCIAL

## 2025-05-19 PROCEDURE — 97530 THERAPEUTIC ACTIVITIES: CPT

## 2025-05-19 PROCEDURE — 97140 MANUAL THERAPY 1/> REGIONS: CPT

## 2025-05-19 NOTE — PROGRESS NOTES
demonstrate reduced fear avoidance behaviors and be able to perform transfers and all functional mobility tasks at a normalized pace without slow cautious movements.  GOAL MET  Patient will be independent with progressed HEP to include core strengthening. GOAL NOT MET  Patient will Increase abdominal strength of 4/5 or greater with habitual inferior to superior approach to allow for increased pelvic organ support. GOAL NOT MET- continue to progress strength   Patient's Modified HUDSON Score will improve to 10/50 or less to demonstrate functional improvement in condition. GOAL NOT MET- 17/50       PLAN:  Treatment Recommendations: Strengthening, Range of Motion, Functional Mobility Training, Transfer Training, Endurance Training, Neuromuscular Re-education, Manual Therapy - Soft Tissue Mobilization, Pain Management, Home Exercise Program, Patient Education, Safety Education and Training, and Integrative Dry Needling    []  Plan of care initiated.  Plan to see patient 1 times per week for 12 weeks to address the treatment planned outlined above.  []  Continue with current plan of care - 2 timer per week for 4 weeks  [x]  Modify plan of care as follows: 5/19/25: extend POC 12 more weeks, seeing pt once every other week   []  Hold pending physician visit  []  Discharge    Time In 801   Time Out 901   Timed Code Minutes: 60 min   Total Treatment Time:  60 min       Electronically Signed by: Jacoby Contreras, PT

## 2025-06-02 ENCOUNTER — HOSPITAL ENCOUNTER (OUTPATIENT)
Dept: PHYSICAL THERAPY | Age: 50
Setting detail: THERAPIES SERIES
Discharge: HOME | End: 2025-06-02
Payer: COMMERCIAL

## 2025-06-02 PROCEDURE — 97140 MANUAL THERAPY 1/> REGIONS: CPT

## 2025-06-02 PROCEDURE — 97530 THERAPEUTIC ACTIVITIES: CPT

## 2025-06-02 NOTE — PROGRESS NOTES
[facility]  PHYSICAL THERAPY  OUTPATIENT REHABILITATION - SPECIALIZED THERAPY SERVICES  [] PELVIC HEALTH EVALUATION  [x] DAILY NOTE  [] PROGRESS NOTE [] DISCHARGE NOTE  [] [facility]    Date: 2025  Patient Name:  Katerin Perry  : 1975  MRN: [id number]  CSN: [id number]    Referring Practitioner Joy Rey MD   Diagnosis Low back pain, unspecified   Treatment Diagnosis M62.89 - Other Specified Disorders of Muscle  M62.81 - Muscle Weakness (Generalized)  M54.5 - Low Back Pain  M62.830 - Muscle Spasms of Back, K55.09 - Other Constipation, and R10.30 - Lower Abdominal Pain, Unspecified  L90.5 - Scar Conditions and Fibrosis of Skin  M79.10 - Myalgia, Unspecified Site   Date of Evaluation 24    Additional Pertinent History HTN, ectopic pregnancy , hysterectomy       Functional Outcome Measure Used Modified HUDSON   Functional Outcome Score 30/50 (24)    21/50 (24) 17/50 (25)       Insurance: Primary: Payor: [employer] /  /  / ,   Secondary:    Authorization Information: No precert required   Visit # 8/15 for progress note (Reporting Period: 24 to 25)   Visits Allowed: THE PATIENT WON AN APPEAL THROUGH HER  FOR 15 PT VISITS (NO DATE RANGE).  CPT CODES  62092  76082  89124  29227   Recertification Date: 24    Physician Follow-Up: Mid March   Physician Orders:    History of Present Illness: Pt is a 49 year old female s/p hysterectomy on 2/15/24. Pt reports that her muscles in her lower back feel very tight. She states that she can not get them to relax and it is very uncomfortable. States that she is sitting and laying a lot and feels like she is very stiff. Bathing and getting dressed both are uncomfortable for her with bending forward. Hysterectomy was done due to abnormal cells in the cervix and it was decided to remove the uterus to avoid any complications. Ovaries remain intact at this time. She is on a 10# weight lifting

## 2025-06-09 ENCOUNTER — APPOINTMENT (OUTPATIENT)
Dept: PHYSICAL THERAPY | Age: 50
End: 2025-06-09
Payer: COMMERCIAL

## 2025-06-16 ENCOUNTER — HOSPITAL ENCOUNTER (OUTPATIENT)
Dept: PHYSICAL THERAPY | Age: 50
Setting detail: THERAPIES SERIES
Discharge: HOME | End: 2025-06-16
Payer: COMMERCIAL

## 2025-06-16 PROCEDURE — 97140 MANUAL THERAPY 1/> REGIONS: CPT

## 2025-06-16 PROCEDURE — 97530 THERAPEUTIC ACTIVITIES: CPT

## 2025-06-16 NOTE — PROGRESS NOTES
[facility]  PHYSICAL THERAPY  OUTPATIENT REHABILITATION - SPECIALIZED THERAPY SERVICES  [] PELVIC HEALTH EVALUATION  [x] DAILY NOTE  [] PROGRESS NOTE [] DISCHARGE NOTE  [] [facility]    Date: 2025  Patient Name:  Katerin Perry  : 1975  MRN: [id number]  CSN: [id number]    Referring Practitioner Joy Rey MD   Diagnosis Low back pain, unspecified   Treatment Diagnosis M62.89 - Other Specified Disorders of Muscle  M62.81 - Muscle Weakness (Generalized)  M54.5 - Low Back Pain  M62.830 - Muscle Spasms of Back, K55.09 - Other Constipation, and R10.30 - Lower Abdominal Pain, Unspecified  L90.5 - Scar Conditions and Fibrosis of Skin  M79.10 - Myalgia, Unspecified Site   Date of Evaluation 24    Additional Pertinent History HTN, ectopic pregnancy , hysterectomy       Functional Outcome Measure Used Modified HUDSON   Functional Outcome Score 30/50 (24)    21/50 (24) 17/50 (25)       Insurance: Primary: Payor: [employer] /  /  / ,   Secondary:    Authorization Information: No precert required   Visit # 9/15 for progress note (Reporting Period: 24 to 25)   Visits Allowed: THE PATIENT WON AN APPEAL THROUGH HER  FOR 15 PT VISITS (NO DATE RANGE).  CPT CODES  90815  26211  86508  [id number]   Recertification Date: 24    Physician Follow-Up: Mid March   Physician Orders:    History of Present Illness: Pt is a 49 year old female s/p hysterectomy on 2/15/24. Pt reports that her muscles in her lower back feel very tight. She states that she can not get them to relax and it is very uncomfortable. States that she is sitting and laying a lot and feels like she is very stiff. Bathing and getting dressed both are uncomfortable for her with bending forward. Hysterectomy was done due to abnormal cells in the cervix and it was decided to remove the uterus to avoid any complications. Ovaries remain intact at this time. She is on a 10# weight lifting

## 2025-06-30 ENCOUNTER — HOSPITAL ENCOUNTER (OUTPATIENT)
Dept: PHYSICAL THERAPY | Age: 50
Setting detail: THERAPIES SERIES
Discharge: HOME | End: 2025-06-30
Payer: COMMERCIAL

## 2025-06-30 PROCEDURE — 97140 MANUAL THERAPY 1/> REGIONS: CPT

## 2025-06-30 PROCEDURE — 97110 THERAPEUTIC EXERCISES: CPT

## 2025-06-30 NOTE — PROGRESS NOTES
[facility]  PHYSICAL THERAPY  OUTPATIENT REHABILITATION - SPECIALIZED THERAPY SERVICES  [] PELVIC HEALTH EVALUATION  [x] DAILY NOTE  [] PROGRESS NOTE [] DISCHARGE NOTE  [] [facility]    Date: 2025  Patient Name:  Katerin Perry  : 1975  MRN: [id number]  CSN: [id number]    Referring Practitioner Jyo Rey MD   Diagnosis Low back pain, unspecified   Treatment Diagnosis M62.89 - Other Specified Disorders of Muscle  M62.81 - Muscle Weakness (Generalized)  M54.5 - Low Back Pain  M62.830 - Muscle Spasms of Back, K55.09 - Other Constipation, and R10.30 - Lower Abdominal Pain, Unspecified  L90.5 - Scar Conditions and Fibrosis of Skin  M79.10 - Myalgia, Unspecified Site   Date of Evaluation 24    Additional Pertinent History HTN, ectopic pregnancy , hysterectomy       Functional Outcome Measure Used Modified HUDSON   Functional Outcome Score 30/50 (24)    21/50 (24) 17/50 (25)       Insurance: Primary: Payor: [employer] /  /  / ,   Secondary:    Authorization Information: No precert required   Visit # 10/15 for progress note (Reporting Period: 24 to 25)   Visits Allowed: THE PATIENT WON AN APPEAL THROUGH HER  FOR 15 PT VISITS (NO DATE RANGE).  CPT CODES  39740  01345  45668  40296   Recertification Date: 24    Physician Follow-Up: Mid March   Physician Orders:    History of Present Illness: Pt is a 49 year old female s/p hysterectomy on 2/15/24. Pt reports that her muscles in her lower back feel very tight. She states that she can not get them to relax and it is very uncomfortable. States that she is sitting and laying a lot and feels like she is very stiff. Bathing and getting dressed both are uncomfortable for her with bending forward. Hysterectomy was done due to abnormal cells in the cervix and it was decided to remove the uterus to avoid any complications. Ovaries remain intact at this time. She is on a 10# weight lifting

## 2025-07-14 ENCOUNTER — HOSPITAL ENCOUNTER (OUTPATIENT)
Dept: PHYSICAL THERAPY | Age: 50
Setting detail: THERAPIES SERIES
Discharge: HOME | End: 2025-07-14
Payer: COMMERCIAL

## 2025-07-14 PROCEDURE — 97140 MANUAL THERAPY 1/> REGIONS: CPT

## 2025-07-14 PROCEDURE — 97530 THERAPEUTIC ACTIVITIES: CPT

## 2025-07-14 NOTE — PROGRESS NOTES
restriction at this time. Had f/u on 2/21 and was told that everything is looking good. Katorolax and oxycodone for pain PRN. Colace PRN.      SUBJECTIVE: Pt reports that she feels like she still just feels like she is tight all of the time. She reports that muscle spasms are less but she overall feels tight throughout her body no matter what she does. She feels like muscles relax after PT sessions and after massages; however, the tightness returns either later that night or by the next day. She did miss her massage last week due to work schedule and feels like her muscles are definitely tighter when she misses any appointments. She also has been more active outside and has also been doing more activities with arms stretched out in front of her. On those nights she could feel the muscles getting very tight and thought that a muscle spasm was going to happen but thankfully they did not go into a full muscle spasm. She reports that she does feel the exercises are helping, but she wishes things would go back to normal faster. Tightness is still through the midback region. She did end up having chiro adjust her ribs after last PT session and she does follow up with them again later today. Has started adding in the twist with palloff press and occasionally gets increased pain with it but is pushing through because she wants to start feeling back to normal. She is trying to stretch throughout the day while sitting and working as well to avoid increased tightness with work activities.       Social/Functional History and Current Status:  Medications and Allergies have been reviewed and are listed on Medical History Questionnaire.    Katerin Perry lives with spouse in a single story home with stairs and no handrail to enter.    Task Previous Current   ADL’s  Independent Has returned to household activities; if she does more than normal on any given day, then muscle tightness and spasms flare again - bonifacio reaching tasks

## 2025-07-28 ENCOUNTER — HOSPITAL ENCOUNTER (OUTPATIENT)
Dept: PHYSICAL THERAPY | Age: 50
Setting detail: THERAPIES SERIES
Discharge: HOME | End: 2025-07-28
Payer: COMMERCIAL

## 2025-07-28 PROCEDURE — 97530 THERAPEUTIC ACTIVITIES: CPT

## 2025-07-28 PROCEDURE — 97140 MANUAL THERAPY 1/> REGIONS: CPT

## 2025-07-28 NOTE — PROGRESS NOTES
often: no         SEXUAL ASSESSMENT [] Deferred secondary to:   Sexually Active No - post op   Pain with [place] (Dyspareunia) No pain reported          VITALS [] Deferred secondary to:   Height 52\"   Weight 153#       GENERAL ASSESSMENT   [x] Deferred secondary to: eval only    Palpation Tender to palp bonifacio at R inferior ribcage today; tenderness and trigger points noted throughout B paraspinals from lumbar all the way superiorly until cervical paraspinals, also with tigthness and trigger points throughout B supraspinatus, subscap, infraspinatus, intercostals, and QL   Observation    Posture WNL   Range of Motion BLE WNL and lumbar flexion limited 25% ; tightness in B hamstrings with L>R   Strength BLE WNL and core 2/5   Gait WNL   Sensation WNL   Edema WNL   Balance/Fall History Denies balance or fall problems   Special Tests          Dry Needling Safety Questions Response   Are you currently receiving any form of cancer treatment? No   Do you have any form of a bleeding disorder? No   Have you ever fainted or experienced a seizure? No   Do you have a pacemaker or any other electrical implant?  No   Are you currently taking any anticoagulants? No   Are you currently taking antibiotics for an infection? No   Do you have a damaged heart valve, metal prosthesis, or other risk of infection? No   Are you pregnant or actively trying for a pregnancy? No   Do you have breast implants? No   Do you suffer from metal allergies? No   Are you diabetic or do you suffer from impaired wound healing? No   Do you have hepatitis B, hepatitis C, HIV, or any other infectious disease? No   Have you eaten in the last two hours? No         TREATMENT   Precautions:    Pain:     \"X” in shaded column indicates activity completed today    “*\" next to exercise/intervention indicates progression   Modalities Parameters/  Location  Notes                     Manual Therapy Time/Technique  Notes   Lumbar/thoracic STM 15 min x Intercostals,

## 2025-08-12 ENCOUNTER — HOSPITAL ENCOUNTER (OUTPATIENT)
Dept: PHYSICAL THERAPY | Age: 50
Setting detail: THERAPIES SERIES
Discharge: HOME | End: 2025-08-12
Payer: COMMERCIAL

## 2025-08-12 PROCEDURE — 97530 THERAPEUTIC ACTIVITIES: CPT

## 2025-08-12 PROCEDURE — 97140 MANUAL THERAPY 1/> REGIONS: CPT

## 2025-08-25 ENCOUNTER — HOSPITAL ENCOUNTER (OUTPATIENT)
Dept: PHYSICAL THERAPY | Age: 50
Setting detail: THERAPIES SERIES
Discharge: HOME | End: 2025-08-25
Payer: COMMERCIAL

## 2025-08-25 PROCEDURE — 97140 MANUAL THERAPY 1/> REGIONS: CPT

## 2025-08-25 PROCEDURE — 97110 THERAPEUTIC EXERCISES: CPT

## 2025-09-03 ENCOUNTER — APPOINTMENT (OUTPATIENT)
Dept: PHYSICAL THERAPY | Age: 50
End: 2025-09-03
Payer: COMMERCIAL

## 2025-09-29 ENCOUNTER — APPOINTMENT (OUTPATIENT)
Dept: PHYSICAL THERAPY | Age: 50
End: 2025-09-29
Payer: COMMERCIAL

## (undated) DEVICE — TIP COVER ACCESSORY

## (undated) DEVICE — AIRSEAL 8 MM ACCESS PORT AND LOW PROFILE OBTURATOR WITH BLADELESS OPTICAL TIP, 120 MM LENGTH: Brand: AIRSEAL

## (undated) DEVICE — PACK PROCEDURE SURG GYN ROBOTIC

## (undated) DEVICE — ELECTRODE PT RET AD L9FT HI MOIST COND ADH HYDRGEL CORDED

## (undated) DEVICE — BLADELESS OBTURATOR: Brand: WECK VISTA

## (undated) DEVICE — GLOVE SURG SZ 6 THK91MIL LTX FREE SYN POLYISOPRENE ANTI

## (undated) DEVICE — POSITIONER HD W8XH4XL8.5IN RASPBERRY FOAM SLT

## (undated) DEVICE — SURGICEL ENDOSCP APPL

## (undated) DEVICE — BANDAGE ADH W1XL3IN NAT FAB WVN FLX DURABLE N ADH PD SEAL

## (undated) DEVICE — VCARE LARGE, UTERINE MANIPULATOR, VAGINAL-CERVICAL-AHLUWALIA'S-RETRACTOR-ELEVATOR: Brand: VCARE

## (undated) DEVICE — SUTURE V-LOC 180 SZ 2-0 L12IN ABSRB VLT GS-21 L37MM 1/2 CIR VLOCM0315

## (undated) DEVICE — 40586 ADVANCED TRENDELENBURG POSITIONING KIT: Brand: 40586 ADVANCED TRENDELENBURG POSITIONING KIT

## (undated) DEVICE — Z DISC USE 2764362 SEAL ENDOSCP INSTR DIA5-8MM UNIV FOR CANN DA VINCI XI

## (undated) DEVICE — Y-TYPE TUR/BLADDER IRRIGATION SET, REGULATING CLAMP

## (undated) DEVICE — SUTURE VCRL + SZ 0 L27IN ABSRB UD CT-1 L36MM 1/2 CIR TAPR VCP260H

## (undated) DEVICE — ARM DRAPE

## (undated) DEVICE — COLUMN DRAPE

## (undated) DEVICE — TRI-LUMEN FILTERED TUBE SET WITH ACTIVATED CHARCOAL FILTER: Brand: AIRSEAL

## (undated) DEVICE — AGENT HEMSTAT 3GM OXIDIZED REGENERATED CELOS ABSRB FOR CONT (ORDER MULTIPLES OF 5EA)

## (undated) DEVICE — BINDER ABD SM M W12IN CIRC 30 45IN 4 PNL E CNTCT CLSR POSTOP